# Patient Record
Sex: FEMALE | Race: WHITE | NOT HISPANIC OR LATINO | Employment: UNEMPLOYED | URBAN - METROPOLITAN AREA
[De-identification: names, ages, dates, MRNs, and addresses within clinical notes are randomized per-mention and may not be internally consistent; named-entity substitution may affect disease eponyms.]

---

## 2017-09-27 ENCOUNTER — HOSPITAL ENCOUNTER (EMERGENCY)
Facility: HOSPITAL | Age: 52
Discharge: HOME | End: 2017-09-27
Attending: EMERGENCY MEDICINE
Payer: MEDICAID

## 2017-09-27 VITALS
DIASTOLIC BLOOD PRESSURE: 73 MMHG | SYSTOLIC BLOOD PRESSURE: 129 MMHG | TEMPERATURE: 99 F | RESPIRATION RATE: 12 BRPM | HEIGHT: 62 IN | OXYGEN SATURATION: 98 % | WEIGHT: 118 LBS | BODY MASS INDEX: 21.71 KG/M2 | HEART RATE: 67 BPM

## 2017-09-27 DIAGNOSIS — M54.50 ACUTE BILATERAL LOW BACK PAIN WITHOUT SCIATICA: Primary | ICD-10-CM

## 2017-09-27 PROCEDURE — 96372 THER/PROPH/DIAG INJ SC/IM: CPT

## 2017-09-27 PROCEDURE — 99283 EMERGENCY DEPT VISIT LOW MDM: CPT | Mod: 25

## 2017-09-27 PROCEDURE — 25000003 PHARM REV CODE 250: Performed by: PHYSICIAN ASSISTANT

## 2017-09-27 PROCEDURE — 63600175 PHARM REV CODE 636 W HCPCS: Performed by: PHYSICIAN ASSISTANT

## 2017-09-27 RX ORDER — METHYLPREDNISOLONE 4 MG/1
TABLET ORAL
Qty: 1 PACKAGE | Refills: 0 | Status: SHIPPED | OUTPATIENT
Start: 2017-09-27 | End: 2017-10-18

## 2017-09-27 RX ORDER — CYCLOBENZAPRINE HCL 10 MG
10 TABLET ORAL 3 TIMES DAILY PRN
Status: ON HOLD | COMMUNITY
End: 2018-10-18 | Stop reason: CLARIF

## 2017-09-27 RX ORDER — DICLOFENAC SODIUM 75 MG/1
75 TABLET, DELAYED RELEASE ORAL 2 TIMES DAILY PRN
Qty: 30 TABLET | Refills: 0 | Status: ON HOLD | OUTPATIENT
Start: 2017-09-27 | End: 2018-10-18 | Stop reason: CLARIF

## 2017-09-27 RX ORDER — METHOCARBAMOL 500 MG/1
500 TABLET, FILM COATED ORAL 4 TIMES DAILY
Status: ON HOLD | COMMUNITY
End: 2018-10-18 | Stop reason: CLARIF

## 2017-09-27 RX ORDER — LIDOCAINE 50 MG/G
1 PATCH TOPICAL
Status: DISCONTINUED | OUTPATIENT
Start: 2017-09-27 | End: 2017-09-27 | Stop reason: HOSPADM

## 2017-09-27 RX ORDER — KETOROLAC TROMETHAMINE 30 MG/ML
30 INJECTION, SOLUTION INTRAMUSCULAR; INTRAVENOUS
Status: COMPLETED | OUTPATIENT
Start: 2017-09-27 | End: 2017-09-27

## 2017-09-27 RX ORDER — IBUPROFEN 800 MG/1
800 TABLET ORAL 3 TIMES DAILY
Status: ON HOLD | COMMUNITY
End: 2018-10-18 | Stop reason: CLARIF

## 2017-09-27 RX ORDER — ORPHENADRINE CITRATE 30 MG/ML
60 INJECTION INTRAMUSCULAR; INTRAVENOUS
Status: COMPLETED | OUTPATIENT
Start: 2017-09-27 | End: 2017-09-27

## 2017-09-27 RX ORDER — TIZANIDINE 2 MG/1
4 TABLET ORAL EVERY 6 HOURS PRN
Qty: 20 TABLET | Refills: 0 | Status: SHIPPED | OUTPATIENT
Start: 2017-09-27 | End: 2017-10-07

## 2017-09-27 RX ADMIN — KETOROLAC TROMETHAMINE 30 MG: 30 INJECTION, SOLUTION INTRAMUSCULAR at 10:09

## 2017-09-27 RX ADMIN — LIDOCAINE 1 PATCH: 50 PATCH TOPICAL at 10:09

## 2017-09-27 RX ADMIN — ORPHENADRINE CITRATE 60 MG: 30 INJECTION INTRAMUSCULAR; INTRAVENOUS at 10:09

## 2017-09-27 NOTE — ED PROVIDER NOTES
"Encounter Date: 9/27/2017       History     Chief Complaint   Patient presents with    Nephrolithiasis     On monday seen at urgent care for low back pain s/p exercising and diagnosed with nephrolithiasis.     Khalida Mckeon is a 52 y.o. Female presenting for evaluation of lower back pain, persisting for the last week since injuring the back while working out with her .  She states she stoop up from a "dead lift" when she felt and heard a pop in her lower back.  She has been evaluated at Urgent Care twice.  She has been given Flexeril, but she doesn't take it because it makes her sleepy.  She has taken Ibuprofen, IM Toradol, IM steroid with little improvement.  No loss of bowel or bladder control.  No numbness, tingling or weakness.  The pain worsens with movement.  X-rays were performed at urgent care, which showed incidental renal stones.  She denies any hematuria or dysuria. She has never had problems with kidney stones previously.       The history is provided by the patient.     Review of patient's allergies indicates:  No Known Allergies  History reviewed. No pertinent past medical history.  History reviewed. No pertinent surgical history.  History reviewed. No pertinent family history.  Social History   Substance Use Topics    Smoking status: Former Smoker     Packs/day: 0.50     Years: 20.00     Types: Cigarettes     Quit date: 10/9/2014    Smokeless tobacco: Never Used    Alcohol use Not on file     Review of Systems   Constitutional: Negative for chills and fever.   Respiratory: Negative for cough, chest tightness, shortness of breath and wheezing.    Cardiovascular: Negative for chest pain and palpitations.   Gastrointestinal: Negative for abdominal pain, diarrhea, nausea and vomiting.   Genitourinary: Negative for dysuria, hematuria, pelvic pain, vaginal bleeding and vaginal discharge.   Musculoskeletal: Positive for arthralgias, back pain and myalgias. Negative for joint " swelling, neck pain and neck stiffness.   Skin: Negative for color change, pallor, rash and wound.   Neurological: Negative for weakness and numbness.   Hematological: Does not bruise/bleed easily.   Psychiatric/Behavioral: The patient is not nervous/anxious.        Physical Exam     Initial Vitals [09/27/17 0921]   BP Pulse Resp Temp SpO2   129/73 67 12 98.8 °F (37.1 °C) 98 %      MAP       91.67         Physical Exam    Nursing note and vitals reviewed.  Constitutional: She appears well-developed and well-nourished. She is not diaphoretic. No distress.   HENT:   Head: Normocephalic and atraumatic.   Neck: Normal range of motion. Neck supple.   Cardiovascular: Normal rate, regular rhythm, normal heart sounds and intact distal pulses.   No murmur heard.  Pulmonary/Chest: Breath sounds normal. No respiratory distress. She has no wheezes. She has no rhonchi. She has no rales.   Abdominal: Soft. She exhibits no distension and no mass. There is no tenderness.   No suprapubic tenderness or CVA tenderness.   Musculoskeletal: Normal range of motion. She exhibits tenderness. She exhibits no edema.        Lumbar back: She exhibits tenderness, pain and spasm. She exhibits normal range of motion, no bony tenderness, no swelling, no edema, no deformity, no laceration and normal pulse.        Back:    Tenderness to palpation noted to bilateral lumbar paraspinal muscles with spasm.  Minimal midline lumbar spinous process tenderness noted.  No decreased range of motion, decreased strength or loss of sensation to bilateral lower extremities.  Palpable 2+ pedal pulses.   Neurological: She is alert and oriented to person, place, and time. She has normal strength. No sensory deficit.   Skin: Skin is warm and dry. No rash and no abscess noted. No erythema.   Psychiatric: She has a normal mood and affect.         ED Course   Procedures  Labs Reviewed - No data to display          Medical Decision Making:   Differential Diagnosis:    Lumbar strain  Lumbar radiculopathy  Cauda equina  Epidural abscess  Pyelonephritis  Renal stone       APC / Resident Notes:   X-rays from urgent care are reviewed of the lumbar spine show no acute abnormalities, fractures.  Her symptoms are less likely related to incidental finding of renal stones on previous lumbar spine x-rays.  Her symptoms are likely muscular in nature.  We do not feel emergent MRI is indicated here in the emergency department.  She is given a dose of IM Toradol and Norflex here in the ER.  Lidoderm patches applied.  She is discharged home with prescription for Zanaflex, Voltaren and Medrol Dosepak.  She is encouraged to follow-up with physical therapy and/or chiropractor for reevaluation in the next couple of days.  She is also referred to orthopedic spine surgery, but will not likely be able to see them for several weeks.  She is discharged home in given specific return precautions.              ED Course      Clinical Impression:   The encounter diagnosis was Acute bilateral low back pain without sciatica.                           Suzie Carvajal PA-C  09/27/17 [id number]

## 2017-09-27 NOTE — ED NOTES
"Pt states "if my pain does not all go away by tonight I am coming back here I want it all to be better right now" given Dc follow up care instructions per PA including chiropractic and massage with PT Given written and verbal DC instructions questions answered per MD aware to follow up with PCP encouraged to return if needed. Given RX with teaching.   "

## 2017-11-15 ENCOUNTER — CLINICAL SUPPORT (OUTPATIENT)
Dept: SMOKING CESSATION | Facility: CLINIC | Age: 52
End: 2017-11-15
Payer: COMMERCIAL

## 2017-11-15 DIAGNOSIS — F17.200 NICOTINE DEPENDENCE: Primary | ICD-10-CM

## 2017-11-15 PROCEDURE — 99406 BEHAV CHNG SMOKING 3-10 MIN: CPT | Mod: S$GLB,,, | Performed by: INTERNAL MEDICINE

## 2017-11-21 ENCOUNTER — HOSPITAL ENCOUNTER (OUTPATIENT)
Dept: RADIOLOGY | Facility: HOSPITAL | Age: 52
Discharge: HOME | End: 2017-11-21
Attending: INTERNAL MEDICINE
Payer: MEDICAID

## 2017-11-21 DIAGNOSIS — E21.3 HYPERPARATHYROIDISM: ICD-10-CM

## 2017-11-21 PROCEDURE — 78071 PARATHYRD PLANAR W/WO SUBTRJ: CPT | Mod: 26,,, | Performed by: RADIOLOGY

## 2017-11-21 PROCEDURE — A9500 TC99M SESTAMIBI: HCPCS

## 2017-11-21 PROCEDURE — 78071 PARATHYRD PLANAR W/WO SUBTRJ: CPT | Mod: TC

## 2018-08-01 DIAGNOSIS — E83.52 HYPERCALCEMIA: ICD-10-CM

## 2018-08-01 DIAGNOSIS — E21.3 HPTH (HYPERPARATHYROIDISM): Primary | ICD-10-CM

## 2018-08-08 ENCOUNTER — HOSPITAL ENCOUNTER (OUTPATIENT)
Dept: RADIOLOGY | Facility: HOSPITAL | Age: 53
Discharge: HOME | End: 2018-08-08
Attending: OTOLARYNGOLOGY
Payer: MEDICAID

## 2018-08-08 DIAGNOSIS — E83.52 HYPERCALCEMIA: ICD-10-CM

## 2018-08-08 DIAGNOSIS — E21.3 HPTH (HYPERPARATHYROIDISM): ICD-10-CM

## 2018-08-08 PROCEDURE — 70491 CT SOFT TISSUE NECK W/DYE: CPT | Mod: TC

## 2018-08-08 PROCEDURE — 25500020 PHARM REV CODE 255

## 2018-08-08 PROCEDURE — 70491 CT SOFT TISSUE NECK W/DYE: CPT | Mod: 26,,, | Performed by: RADIOLOGY

## 2018-08-08 RX ORDER — SODIUM CHLORIDE 9 MG/ML
INJECTION, SOLUTION INTRAVENOUS
Status: DISCONTINUED
Start: 2018-08-08 | End: 2018-08-09 | Stop reason: HOSPADM

## 2018-08-08 RX ADMIN — IOHEXOL 75 ML: 350 INJECTION, SOLUTION INTRAVENOUS at 02:08

## 2018-10-18 ENCOUNTER — HOSPITAL ENCOUNTER (OUTPATIENT)
Facility: HOSPITAL | Age: 53
Discharge: HOME | End: 2018-10-19
Attending: EMERGENCY MEDICINE | Admitting: INTERNAL MEDICINE
Payer: MEDICAID

## 2018-10-18 ENCOUNTER — ANESTHESIA EVENT (OUTPATIENT)
Dept: SURGERY | Facility: HOSPITAL | Age: 53
End: 2018-10-18
Payer: MEDICAID

## 2018-10-18 ENCOUNTER — ANESTHESIA (OUTPATIENT)
Dept: SURGERY | Facility: HOSPITAL | Age: 53
End: 2018-10-18
Payer: MEDICAID

## 2018-10-18 DIAGNOSIS — N13.2 URETERAL STONE WITH HYDRONEPHROSIS: ICD-10-CM

## 2018-10-18 DIAGNOSIS — N23 RENAL COLIC: ICD-10-CM

## 2018-10-18 DIAGNOSIS — Z01.818 PRE-OP EVALUATION: ICD-10-CM

## 2018-10-18 DIAGNOSIS — E21.3 HYPERPARATHYROIDISM: Primary | ICD-10-CM

## 2018-10-18 DIAGNOSIS — F17.210 CIGARETTE NICOTINE DEPENDENCE WITHOUT COMPLICATION: ICD-10-CM

## 2018-10-18 LAB
ALBUMIN SERPL BCP-MCNC: 4.4 G/DL
ALP SERPL-CCNC: 96 U/L
ALT SERPL W/O P-5'-P-CCNC: 42 U/L
ANION GAP SERPL CALC-SCNC: 8 MMOL/L
AST SERPL-CCNC: 37 U/L
BACTERIA #/AREA URNS HPF: ABNORMAL /HPF
BASOPHILS # BLD AUTO: 0 K/UL
BASOPHILS NFR BLD: 0.3 %
BILIRUB SERPL-MCNC: 0.3 MG/DL
BILIRUB UR QL STRIP: NEGATIVE
BUN SERPL-MCNC: 16 MG/DL
CALCIUM SERPL-MCNC: 12 MG/DL
CHLORIDE SERPL-SCNC: 105 MMOL/L
CLARITY UR: CLEAR
CO2 SERPL-SCNC: 25 MMOL/L
COLOR UR: YELLOW
CREAT SERPL-MCNC: 0.8 MG/DL
DIFFERENTIAL METHOD: ABNORMAL
EOSINOPHIL # BLD AUTO: 0.1 K/UL
EOSINOPHIL NFR BLD: 1.3 %
ERYTHROCYTE [DISTWIDTH] IN BLOOD BY AUTOMATED COUNT: 13.4 %
EST. GFR  (AFRICAN AMERICAN): >60 ML/MIN/1.73 M^2
EST. GFR  (NON AFRICAN AMERICAN): >60 ML/MIN/1.73 M^2
GLUCOSE SERPL-MCNC: 123 MG/DL
GLUCOSE UR QL STRIP: NEGATIVE
HCT VFR BLD AUTO: 40.6 %
HGB BLD-MCNC: 13.3 G/DL
HGB UR QL STRIP: ABNORMAL
KETONES UR QL STRIP: NEGATIVE
LEUKOCYTE ESTERASE UR QL STRIP: NEGATIVE
LYMPHOCYTES # BLD AUTO: 1.8 K/UL
LYMPHOCYTES NFR BLD: 24.8 %
MCH RBC QN AUTO: 27.8 PG
MCHC RBC AUTO-ENTMCNC: 32.8 G/DL
MCV RBC AUTO: 85 FL
MICROSCOPIC COMMENT: ABNORMAL
MONOCYTES # BLD AUTO: 0.6 K/UL
MONOCYTES NFR BLD: 7.7 %
NEUTROPHILS # BLD AUTO: 4.7 K/UL
NEUTROPHILS NFR BLD: 65.9 %
NITRITE UR QL STRIP: NEGATIVE
PH UR STRIP: 6 [PH] (ref 5–8)
PLATELET # BLD AUTO: 188 K/UL
PMV BLD AUTO: 8.9 FL
POTASSIUM SERPL-SCNC: 4.4 MMOL/L
PROT SERPL-MCNC: 7.5 G/DL
PROT UR QL STRIP: ABNORMAL
RBC # BLD AUTO: 4.78 M/UL
RBC #/AREA URNS HPF: 48 /HPF (ref 0–4)
SODIUM SERPL-SCNC: 138 MMOL/L
SP GR UR STRIP: 1.02 (ref 1–1.03)
URN SPEC COLLECT METH UR: ABNORMAL
UROBILINOGEN UR STRIP-ACNC: NEGATIVE EU/DL
WBC # BLD AUTO: 7.2 K/UL
WBC #/AREA URNS HPF: 2 /HPF (ref 0–5)

## 2018-10-18 PROCEDURE — 99226 PR SUBSEQUENT OBSERVATION CARE,LEVEL III: CPT | Mod: 25,,, | Performed by: UROLOGY

## 2018-10-18 PROCEDURE — 87086 URINE CULTURE/COLONY COUNT: CPT

## 2018-10-18 PROCEDURE — 25000003 PHARM REV CODE 250: Performed by: NURSE ANESTHETIST, CERTIFIED REGISTERED

## 2018-10-18 PROCEDURE — C2617 STENT, NON-COR, TEM W/O DEL: HCPCS | Performed by: UROLOGY

## 2018-10-18 PROCEDURE — 63600175 PHARM REV CODE 636 W HCPCS: Performed by: NURSE ANESTHETIST, CERTIFIED REGISTERED

## 2018-10-18 PROCEDURE — 25000003 PHARM REV CODE 250: Performed by: UROLOGY

## 2018-10-18 PROCEDURE — G0378 HOSPITAL OBSERVATION PER HR: HCPCS

## 2018-10-18 PROCEDURE — 00918 ANES TRURL PX URTRL CAL RMVL: CPT | Performed by: UROLOGY

## 2018-10-18 PROCEDURE — 36415 COLL VENOUS BLD VENIPUNCTURE: CPT

## 2018-10-18 PROCEDURE — D9220A PRA ANESTHESIA: Mod: ANES,,, | Performed by: ANESTHESIOLOGY

## 2018-10-18 PROCEDURE — 99900104 DSU ONLY-NO CHARGE-EA ADD'L HR (STAT): Performed by: UROLOGY

## 2018-10-18 PROCEDURE — 99900103 DSU ONLY-NO CHARGE-INITIAL HR (STAT): Performed by: UROLOGY

## 2018-10-18 PROCEDURE — 25000003 PHARM REV CODE 250: Performed by: EMERGENCY MEDICINE

## 2018-10-18 PROCEDURE — C1758 CATHETER, URETERAL: HCPCS | Performed by: UROLOGY

## 2018-10-18 PROCEDURE — 96374 THER/PROPH/DIAG INJ IV PUSH: CPT

## 2018-10-18 PROCEDURE — 25000003 PHARM REV CODE 250: Performed by: NURSE PRACTITIONER

## 2018-10-18 PROCEDURE — 36000706: Performed by: UROLOGY

## 2018-10-18 PROCEDURE — 52356 CYSTO/URETERO W/LITHOTRIPSY: CPT | Mod: 22,RT,, | Performed by: UROLOGY

## 2018-10-18 PROCEDURE — 27201423 OPTIME MED/SURG SUP & DEVICES STERILE SUPPLY: Performed by: UROLOGY

## 2018-10-18 PROCEDURE — D9220A PRA ANESTHESIA: Mod: CRNA,,, | Performed by: NURSE ANESTHETIST, CERTIFIED REGISTERED

## 2018-10-18 PROCEDURE — 76000 FLUOROSCOPY <1 HR PHYS/QHP: CPT | Mod: 26,59,, | Performed by: UROLOGY

## 2018-10-18 PROCEDURE — 99219 PR INITIAL OBSERVATION CARE,LEVL II: CPT | Mod: ,,, | Performed by: INTERNAL MEDICINE

## 2018-10-18 PROCEDURE — 93010 ELECTROCARDIOGRAM REPORT: CPT | Mod: ,,, | Performed by: INTERNAL MEDICINE

## 2018-10-18 PROCEDURE — 25500020 PHARM REV CODE 255: Performed by: UROLOGY

## 2018-10-18 PROCEDURE — C1769 GUIDE WIRE: HCPCS | Performed by: UROLOGY

## 2018-10-18 PROCEDURE — 81000 URINALYSIS NONAUTO W/SCOPE: CPT

## 2018-10-18 PROCEDURE — 63600175 PHARM REV CODE 636 W HCPCS: Performed by: UROLOGY

## 2018-10-18 PROCEDURE — 37000009 HC ANESTHESIA EA ADD 15 MINS: Performed by: UROLOGY

## 2018-10-18 PROCEDURE — 80053 COMPREHEN METABOLIC PANEL: CPT

## 2018-10-18 PROCEDURE — 85025 COMPLETE CBC W/AUTO DIFF WBC: CPT

## 2018-10-18 PROCEDURE — 25000003 PHARM REV CODE 250: Performed by: INTERNAL MEDICINE

## 2018-10-18 PROCEDURE — 94761 N-INVAS EAR/PLS OXIMETRY MLT: CPT | Mod: 59

## 2018-10-18 PROCEDURE — 36000707: Performed by: UROLOGY

## 2018-10-18 PROCEDURE — S5010 5% DEXTROSE AND 0.45% SALINE: HCPCS | Performed by: UROLOGY

## 2018-10-18 PROCEDURE — 71000033 HC RECOVERY, INTIAL HOUR: Performed by: UROLOGY

## 2018-10-18 PROCEDURE — 99285 EMERGENCY DEPT VISIT HI MDM: CPT | Mod: 25

## 2018-10-18 PROCEDURE — 37000008 HC ANESTHESIA 1ST 15 MINUTES: Performed by: UROLOGY

## 2018-10-18 PROCEDURE — 74420 UROGRAPHY RTRGR +-KUB: CPT | Mod: 26,,, | Performed by: UROLOGY

## 2018-10-18 PROCEDURE — 93005 ELECTROCARDIOGRAM TRACING: CPT | Mod: 59

## 2018-10-18 PROCEDURE — 71000039 HC RECOVERY, EACH ADD'L HOUR: Performed by: UROLOGY

## 2018-10-18 DEVICE — STENT SET URETERAL 6X26CM: Type: IMPLANTABLE DEVICE | Site: URETER | Status: FUNCTIONAL

## 2018-10-18 RX ORDER — SODIUM CHLORIDE 0.9 % (FLUSH) 0.9 %
3 SYRINGE (ML) INJECTION
Status: DISCONTINUED | OUTPATIENT
Start: 2018-10-18 | End: 2018-10-18 | Stop reason: HOSPADM

## 2018-10-18 RX ORDER — GENTAMICIN SULFATE 80 MG/100ML
80 INJECTION, SOLUTION INTRAVENOUS
Status: DISCONTINUED | OUTPATIENT
Start: 2018-10-18 | End: 2018-10-19 | Stop reason: HOSPADM

## 2018-10-18 RX ORDER — SODIUM CHLORIDE, SODIUM LACTATE, POTASSIUM CHLORIDE, CALCIUM CHLORIDE 600; 310; 30; 20 MG/100ML; MG/100ML; MG/100ML; MG/100ML
INJECTION, SOLUTION INTRAVENOUS CONTINUOUS PRN
Status: DISCONTINUED | OUTPATIENT
Start: 2018-10-18 | End: 2018-10-18

## 2018-10-18 RX ORDER — KETOROLAC TROMETHAMINE 30 MG/ML
15 INJECTION, SOLUTION INTRAMUSCULAR; INTRAVENOUS ONCE
Status: COMPLETED | OUTPATIENT
Start: 2018-10-18 | End: 2018-10-18

## 2018-10-18 RX ORDER — ONDANSETRON HYDROCHLORIDE 2 MG/ML
INJECTION, SOLUTION INTRAMUSCULAR; INTRAVENOUS
Status: DISCONTINUED | OUTPATIENT
Start: 2018-10-18 | End: 2018-10-18

## 2018-10-18 RX ORDER — MEPERIDINE HYDROCHLORIDE 50 MG/ML
12.5 INJECTION INTRAMUSCULAR; INTRAVENOUS; SUBCUTANEOUS ONCE AS NEEDED
Status: DISCONTINUED | OUTPATIENT
Start: 2018-10-18 | End: 2018-10-18 | Stop reason: HOSPADM

## 2018-10-18 RX ORDER — BUTALBITAL, ACETAMINOPHEN AND CAFFEINE 50; 325; 40 MG/1; MG/1; MG/1
1 TABLET ORAL EVERY 4 HOURS PRN
COMMUNITY
End: 2018-10-26

## 2018-10-18 RX ORDER — DEXAMETHASONE SODIUM PHOSPHATE 4 MG/ML
INJECTION, SOLUTION INTRA-ARTICULAR; INTRALESIONAL; INTRAMUSCULAR; INTRAVENOUS; SOFT TISSUE
Status: DISCONTINUED | OUTPATIENT
Start: 2018-10-18 | End: 2018-10-18

## 2018-10-18 RX ORDER — FENTANYL CITRATE 50 UG/ML
INJECTION, SOLUTION INTRAMUSCULAR; INTRAVENOUS
Status: DISCONTINUED | OUTPATIENT
Start: 2018-10-18 | End: 2018-10-18

## 2018-10-18 RX ORDER — DIPHENHYDRAMINE HYDROCHLORIDE 50 MG/ML
25 INJECTION INTRAMUSCULAR; INTRAVENOUS EVERY 6 HOURS PRN
Status: DISCONTINUED | OUTPATIENT
Start: 2018-10-18 | End: 2018-10-18 | Stop reason: HOSPADM

## 2018-10-18 RX ORDER — NICOTINE POLACRILEX 2 MG
GUM BUCCAL DAILY
COMMUNITY

## 2018-10-18 RX ORDER — DEXTROSE MONOHYDRATE AND SODIUM CHLORIDE 5; .45 G/100ML; G/100ML
INJECTION, SOLUTION INTRAVENOUS CONTINUOUS
Status: DISCONTINUED | OUTPATIENT
Start: 2018-10-18 | End: 2018-10-19 | Stop reason: HOSPADM

## 2018-10-18 RX ORDER — ONDANSETRON 2 MG/ML
4 INJECTION INTRAMUSCULAR; INTRAVENOUS EVERY 8 HOURS PRN
Status: DISCONTINUED | OUTPATIENT
Start: 2018-10-18 | End: 2018-10-19 | Stop reason: HOSPADM

## 2018-10-18 RX ORDER — PANTOPRAZOLE SODIUM 40 MG/1
40 TABLET, DELAYED RELEASE ORAL DAILY
Status: DISCONTINUED | OUTPATIENT
Start: 2018-10-18 | End: 2018-10-19 | Stop reason: HOSPADM

## 2018-10-18 RX ORDER — MIDAZOLAM HYDROCHLORIDE 1 MG/ML
INJECTION INTRAMUSCULAR; INTRAVENOUS
Status: DISCONTINUED | OUTPATIENT
Start: 2018-10-18 | End: 2018-10-18

## 2018-10-18 RX ORDER — LEVOTHYROXINE SODIUM 75 UG/1
75 TABLET ORAL
COMMUNITY

## 2018-10-18 RX ORDER — AMOXICILLIN 500 MG
1 CAPSULE ORAL DAILY
COMMUNITY
End: 2018-10-23 | Stop reason: CLARIF

## 2018-10-18 RX ORDER — EPHEDRINE SULFATE 50 MG/ML
INJECTION, SOLUTION INTRAVENOUS
Status: DISCONTINUED | OUTPATIENT
Start: 2018-10-18 | End: 2018-10-18

## 2018-10-18 RX ORDER — ACETAMINOPHEN 325 MG/1
650 TABLET ORAL EVERY 6 HOURS PRN
Status: DISCONTINUED | OUTPATIENT
Start: 2018-10-18 | End: 2018-10-19 | Stop reason: HOSPADM

## 2018-10-18 RX ORDER — GLYCOPYRROLATE 0.2 MG/ML
INJECTION INTRAMUSCULAR; INTRAVENOUS
Status: DISCONTINUED | OUTPATIENT
Start: 2018-10-18 | End: 2018-10-18

## 2018-10-18 RX ORDER — AMOXICILLIN 250 MG
1 CAPSULE ORAL DAILY PRN
Status: DISCONTINUED | OUTPATIENT
Start: 2018-10-18 | End: 2018-10-19 | Stop reason: HOSPADM

## 2018-10-18 RX ORDER — PROPOFOL 10 MG/ML
VIAL (ML) INTRAVENOUS
Status: DISCONTINUED | OUTPATIENT
Start: 2018-10-18 | End: 2018-10-18

## 2018-10-18 RX ORDER — SODIUM CHLORIDE 9 MG/ML
INJECTION, SOLUTION INTRAVENOUS CONTINUOUS
Status: DISCONTINUED | OUTPATIENT
Start: 2018-10-18 | End: 2018-10-18

## 2018-10-18 RX ORDER — HYDROMORPHONE HYDROCHLORIDE 2 MG/ML
0.2 INJECTION, SOLUTION INTRAMUSCULAR; INTRAVENOUS; SUBCUTANEOUS EVERY 5 MIN PRN
Status: DISCONTINUED | OUTPATIENT
Start: 2018-10-18 | End: 2018-10-18 | Stop reason: HOSPADM

## 2018-10-18 RX ORDER — FENTANYL CITRATE 50 UG/ML
25 INJECTION, SOLUTION INTRAMUSCULAR; INTRAVENOUS EVERY 5 MIN PRN
Status: DISCONTINUED | OUTPATIENT
Start: 2018-10-18 | End: 2018-10-18 | Stop reason: HOSPADM

## 2018-10-18 RX ORDER — ONDANSETRON 2 MG/ML
4 INJECTION INTRAMUSCULAR; INTRAVENOUS ONCE
Status: DISCONTINUED | OUTPATIENT
Start: 2018-10-18 | End: 2018-10-18 | Stop reason: HOSPADM

## 2018-10-18 RX ORDER — HYDROCODONE BITARTRATE AND ACETAMINOPHEN 5; 325 MG/1; MG/1
1 TABLET ORAL EVERY 6 HOURS PRN
Status: DISCONTINUED | OUTPATIENT
Start: 2018-10-18 | End: 2018-10-19 | Stop reason: HOSPADM

## 2018-10-18 RX ORDER — CEFTRIAXONE 1 G/50ML
1 INJECTION, SOLUTION INTRAVENOUS
Status: COMPLETED | OUTPATIENT
Start: 2018-10-18 | End: 2018-10-18

## 2018-10-18 RX ORDER — LIDOCAINE HCL/PF 100 MG/5ML
SYRINGE (ML) INTRAVENOUS
Status: DISCONTINUED | OUTPATIENT
Start: 2018-10-18 | End: 2018-10-18

## 2018-10-18 RX ORDER — CHOLECALCIFEROL (VITAMIN D3) 50 MCG
2000 TABLET ORAL DAILY
COMMUNITY

## 2018-10-18 RX ORDER — OXYCODONE HYDROCHLORIDE 5 MG/1
5 TABLET ORAL
Status: DISCONTINUED | OUTPATIENT
Start: 2018-10-18 | End: 2018-10-18 | Stop reason: HOSPADM

## 2018-10-18 RX ORDER — SODIUM CHLORIDE, SODIUM LACTATE, POTASSIUM CHLORIDE, CALCIUM CHLORIDE 600; 310; 30; 20 MG/100ML; MG/100ML; MG/100ML; MG/100ML
75 INJECTION, SOLUTION INTRAVENOUS CONTINUOUS
Status: DISCONTINUED | OUTPATIENT
Start: 2018-10-18 | End: 2018-10-18

## 2018-10-18 RX ADMIN — LIDOCAINE HYDROCHLORIDE 100 MG: 20 INJECTION, SOLUTION INTRAVENOUS at 05:10

## 2018-10-18 RX ADMIN — TRAZODONE HYDROCHLORIDE 25 MG: 50 TABLET ORAL at 10:10

## 2018-10-18 RX ADMIN — GLYCOPYRROLATE 0.2 MG: 0.2 INJECTION, SOLUTION INTRAMUSCULAR; INTRAVENOUS at 05:10

## 2018-10-18 RX ADMIN — DEXTROSE AND SODIUM CHLORIDE: 5; .45 INJECTION, SOLUTION INTRAVENOUS at 07:10

## 2018-10-18 RX ADMIN — Medication 1 MG: at 10:10

## 2018-10-18 RX ADMIN — DEXAMETHASONE SODIUM PHOSPHATE 4 MG: 4 INJECTION, SOLUTION INTRAMUSCULAR; INTRAVENOUS at 05:10

## 2018-10-18 RX ADMIN — EPHEDRINE SULFATE 10 MG: 50 INJECTION, SOLUTION INTRAMUSCULAR; INTRAVENOUS; SUBCUTANEOUS at 05:10

## 2018-10-18 RX ADMIN — Medication 1 MG: at 12:10

## 2018-10-18 RX ADMIN — SODIUM CHLORIDE: 0.9 INJECTION, SOLUTION INTRAVENOUS at 12:10

## 2018-10-18 RX ADMIN — PROPOFOL 200 MG: 10 INJECTION, EMULSION INTRAVENOUS at 05:10

## 2018-10-18 RX ADMIN — FENTANYL CITRATE 50 MCG: 50 INJECTION, SOLUTION INTRAMUSCULAR; INTRAVENOUS at 05:10

## 2018-10-18 RX ADMIN — CEFTRIAXONE 1 G: 1 INJECTION, SOLUTION INTRAVENOUS at 05:10

## 2018-10-18 RX ADMIN — GENTAMICIN SULFATE 80 MG: 80 INJECTION, SOLUTION INTRAVENOUS at 07:10

## 2018-10-18 RX ADMIN — MIDAZOLAM HYDROCHLORIDE 2 MG: 1 INJECTION, SOLUTION INTRAMUSCULAR; INTRAVENOUS at 05:10

## 2018-10-18 RX ADMIN — ONDANSETRON 4 MG: 2 INJECTION, SOLUTION INTRAMUSCULAR; INTRAVENOUS at 05:10

## 2018-10-18 RX ADMIN — SODIUM CHLORIDE, SODIUM LACTATE, POTASSIUM CHLORIDE, AND CALCIUM CHLORIDE: .6; .31; .03; .02 INJECTION, SOLUTION INTRAVENOUS at 05:10

## 2018-10-18 RX ADMIN — KETOROLAC TROMETHAMINE 15 MG: 30 INJECTION, SOLUTION INTRAMUSCULAR at 06:10

## 2018-10-18 RX ADMIN — SODIUM CHLORIDE, SODIUM LACTATE, POTASSIUM CHLORIDE, AND CALCIUM CHLORIDE: .6; .31; .03; .02 INJECTION, SOLUTION INTRAVENOUS at 04:10

## 2018-10-18 NOTE — CONSULTS
[facility] [place] [facility] Consult:  Consult from: Dr Mann, [facility] medicine  Consult for: right ureteral calculous obstruction    Khalida Mckeon is a 53 y.o. female found to have 2 obstructing right ureteral calculi on admit for flank pain/n/v.    Reports sudden feeling of n/v woke her up from sleep last night and through morning did not resolve and had abdominal pain/flank pain and continued n/v prompting ER visit.  No history of stones, though was told about 1 year ago that had one present on imaging  Has been undergoing workup for thyroid nodule and HPTH and has pending partial thyroidectomy and parathyroidectomy scheduled for 11/8 at [facility] in .  Has known  Hypercalcemia 2/2 to the above with Ca always ranging 11-13  Denies hematuria dysuria fever chills  2 doses of dilaudid and pain persisted as well as mild nausea    CT stone protocol:  Left kidney; unremarkable appearance without hydronephrosis opaque renal or ureteral stone or ureteral obstruction  Right kidney; asymmetrically enlarged, hypodense, moderate hydronephrosis.  Proximal right ureteral stone at the ureteropelvic junction measuring 1.5 cm cranial caudally, 8 x 10 mm AP and transversely.  Right ureter remains moderately dilated distal to this stone with a 2nd ureteral stone, this 2nd stone being within the distal ureter measuring 9 x 6 by 5 mm several cm above the ureterovesical junction.    On personal review, 1.2x1.5 obstructing UPJ stone with more distal 7x7mm stone in early distal ureter just inside pelvic brim.      No past medical history on file.   Hyperthyoidism  Hyperparathyroidism    No past surgical history on file.    No family history on file.    Social History     Socioeconomic History    Marital status:      Spouse name: Not on file    Number of children: Not on file    Years of education: Not on file    Highest education level: Not on file   Social Needs    Financial resource strain: Not on file    Food insecurity - worry: Not  on file    Food insecurity - inability: Not on file    Transportation needs - medical: Not on file    Transportation needs - non-medical: Not on file   Occupational History    Not on file   Tobacco Use    Smoking status: Former Smoker     Packs/day: 0.50     Years: 20.00     Pack years: 10.00     Types: Cigarettes     Last attempt to quit: 10/9/2014     Years since quittin.0    Smokeless tobacco: Never Used   Substance and Sexual Activity    Alcohol use: Not on file    Drug use: Not on file    Sexual activity: Not on file   Other Topics Concern    Not on file   Social History Narrative    Not on file       Review of patient's allergies indicates:  No Known Allergies    Medications Reviewed: see MAR    ROS:    Constitutional: denies fevers, chills, night sweats, fatigue, malaise  Respiratory: negative for cough, shortness of breath, wheezing, dyspnea.  Cardiovascular: negative for chest pain, varicose veins, ankle swelling, palpitations, syncope.  GI: + abdominal pain, nausea, vomiting, negative for heartburn, indigestion, constipation, diarrhea, blood in stool.   Urology: as noted above in HPI  Endocrinology: negative for cold intolerance, excessive thirst, not feeling tired/sluggish, no heat intolerance.   Hematology/Lymph: negative for easy bleeding, easy bruising, swollen glands.  Musculoskeletal: negative for back pain, joint pain, joint swelling, neck pain.  Allergy-Immunology: negative for seasonal allergies, negative for unusual infections.   Skin: negative for boils, breast lumps, hives, itching, rash.   Neurology: negative for, dizziness, headache, tingling/numbness, tremors.   Psych: satisfied with life; negative for, anxiety, depression, suicidal thoughts.     PHYSICAL EXAM:    Vitals:    10/18/18 1158   BP: (!) 141/75   Pulse: (!) 51   Resp: 18   Temp: 98.1 °F (36.7 °C)     Body mass index is 21.58 kg/m².     General: Alert, cooperative, no distress, appears stated age  Head:  Normocephalic, without obvious abnormality, atraumatic  Neck: no masses, no thyromegaly, no lymphadenopathy  Eyes: PERRL, conjunctiva/corneas clear  Lungs: Respirations unlabored, normal effort, no accessory muscle use  CV: Warm and well perfused extremities  Abdomen: Soft, non-tender, mild R CVA tenderness, no hepatosplenomegaly, no hernia  Extremities: Extremities normal, atraumatic, no cyanosis or edema  Skin: Normal color, texture, and turgor, no rashes or lesions  Psych: Appropriate, well oriented, normal affect, normal mood  Neuro: Non-focal    LABS:    Recent Results (from the past 336 hour(s))   Urinalysis, Reflex to Urine Culture Urine, Clean Catch    Collection Time: 10/18/18  9:44 AM   Result Value Ref Range    Specimen UA Urine, Clean Catch     Color, UA Yellow Yellow, Straw, Nataliya    Appearance, UA Clear Clear    pH, UA 6.0 5.0 - 8.0    Specific Gravity, UA 1.025 1.005 - 1.030    Protein, UA Trace (A) Negative    Glucose, UA Negative Negative    Ketones, UA Negative Negative    Bilirubin (UA) Negative Negative    Occult Blood UA 3+ (A) Negative    Nitrite, UA Negative Negative    Urobilinogen, UA Negative <2.0 EU/dL    Leukocytes, UA Negative Negative   Urinalysis Microscopic    Collection Time: 10/18/18  9:44 AM   Result Value Ref Range    RBC, UA 48 (H) 0 - 4 /hpf    WBC, UA 2 0 - 5 /hpf    Bacteria, UA Occasional None-Occ /hpf    Microscopic Comment SEE COMMENT    Comprehensive metabolic panel    Collection Time: 10/18/18  9:58 AM   Result Value Ref Range    Sodium 138 136 - 145 mmol/L    Potassium 4.4 3.5 - 5.1 mmol/L    Chloride 105 95 - 110 mmol/L    CO2 25 23 - 29 mmol/L    Glucose 123 (H) 70 - 110 mg/dL    BUN, Bld 16 6 - 20 mg/dL    Creatinine 0.8 0.5 - 1.4 mg/dL    Calcium 12.0 (H) 8.7 - 10.5 mg/dL    Total Protein 7.5 6.0 - 8.4 g/dL    Albumin 4.4 3.5 - 5.2 g/dL    Total Bilirubin 0.3 0.1 - 1.0 mg/dL    Alkaline Phosphatase 96 55 - 135 U/L    AST 37 10 - 40 U/L    ALT 42 10 - 44 U/L    Anion  Gap 8 8 - 16 mmol/L    eGFR if African American >60 >60 mL/min/1.73 m^2    eGFR if non African American >60 >60 mL/min/1.73 m^2   CBC auto differential    Collection Time: 10/18/18  9:59 AM   Result Value Ref Range    WBC 7.20 3.90 - 12.70 K/uL    RBC 4.78 4.00 - 5.40 M/uL    Hemoglobin 13.3 12.0 - 16.0 g/dL    Hematocrit 40.6 37.0 - 48.5 %    MCV 85 82 - 98 fL    MCH 27.8 27.0 - 31.0 pg    MCHC 32.8 32.0 - 36.0 g/dL    RDW 13.4 11.5 - 14.5 %    Platelets 188 150 - 350 K/uL    MPV 8.9 (L) 9.2 - 12.9 fL    Gran # (ANC) 4.7 1.8 - 7.7 K/uL    Lymph # 1.8 1.0 - 4.8 K/uL    Mono # 0.6 0.3 - 1.0 K/uL    Eos # 0.1 0.0 - 0.5 K/uL    Baso # 0.00 0.00 - 0.20 K/uL    Gran% 65.9 38.0 - 73.0 %    Lymph% 24.8 18.0 - 48.0 %    Mono% 7.7 4.0 - 15.0 %    Eosinophil% 1.3 0.0 - 8.0 %    Basophil% 0.3 0.0 - 1.9 %    Differential Method Automated          Assessment/Diagnosis:    R ureteral calclui, proximal and distal, with obstruction  Right hydronephrosis  Right flank pain    Plans:  To OR for cystoscopy stent placement on right at minumum, with possible ureteroscopic stone extraction as no signs of infection. Likely if amenable to URS can only manage distal stone and will need to return for management of proximal stone. Could take 2 additional procedures to clear stone burden. Will evaluate for radioopacity of stones and amenability of proximal stone to future ESWL.  Discussed stent symptoms and stent management  1g IV rocephin ordered as preop ppx abx.  All risks and benefits of procedures discussed in detail with family and appropriate informed consent obtained.  Further plan pending intraop findings, though can likely DC home this evening postop if feeling well and return for further stone management. Would ideally complete stone management prior to or soon therafter of other surgery as risk of stent calcification high given her HPTH and hypercalcemia

## 2018-10-18 NOTE — BRIEF OP NOTE
[facility]rology Brief Operative Note    Date: 10/18/2018    Staff Surgeon: Chandler Pritchard MD    Pre-Op Diagnosis:   1. Right proximal and distal obstructing ureteral calculi  2. Right hydroureteronephrosis  3. Right flank pain    Post-Op Diagnosis:   1. Right UPJ obstruction  2. Right renal calculus  3. Right distal ureteral calculus  4. Right hydroureteronephrosis  5. Right flank pain    Procedure(s) Performed:   1. Right rigid ureteroscopy with holmium laser lithotripsy and basket stone extraction (mod 22)  2. Cystoscopy with placement of right double J ureteral stent, [id number]  3. Right retrograde pyelogram  4. Flouroscopy < 1 hour  5. Interpretation of flouroscopic images    Specimen(s): none  - small stone fragment removed from basket extraction removed from backtable before collection    Anesthesia: General LMA anesthesia    Findings:   Tortuous proximal ureter, 180 degree turn to pass wire into collecting system, with evidence of UPJO and proximal jet streaming of contrast at proximal portion of kinked ureter  - right 1.5cm renal calculus was laterally displaced towards midpole and not source of obstruction proximally as concern for UPJO above  - dilated collecting system  - 7mm stone in early distal ureter fragmented and removed, and ureter dilated proximal to this location  - stagnant cloudy urine, bloodtinged, effluxing on relief of obstruction    Estimated Blood Loss: minimal    Drains: 18 Latvian nance    Complications: none    Disposition:   Will keep overnight for observation, admitted back to Dr Mann - case discussed with him  Got 1g IV rocephin preop  Given significant obstruction and appearance of urine draining, 80mg IV gent Q8 while in house  Nance overnight to facilitate clearance of obstructed urine from collecting system along stent  Nance can be removed in AM after MD assessment if afebrile, and DC home    Should have 5d course of broad spectrum abx (ie bactrim ds) and flomax 0.4 mg  daily  Will make plans to reevaluate as outpatient to further evaluate upper tract obstruction (unlikely to be complete given passage of stone into distal ureteral), and may just be sequelae of long term obstruction and hydroureter causing proximal dilation/tortuousity - however pathognomic jet-streaming of contrast and difficult passage of guidewire into collecting system.   Could consider ESWL while stent in place after passive dilation and appropriate drainage, vs repeat cysto/RPG eval, vs. Stent removal and workup for UPJO further with CT urogram and MAG3.

## 2018-10-18 NOTE — TRANSFER OF CARE
"Anesthesia Transfer of Care Note    Patient: Khalida Mckeon    Procedure(s) Performed: Procedure(s) (LRB):  CYSTOSCOPY, WITH RETROGRADE PYELOGRAM AND URETERAL STENT INSERTION (Right)  REMOVAL, CALCULUS, URETER, URETEROSCOPIC (Right)  LITHOTRIPSY, USING LASER (Right)    Patient location: PACU    Anesthesia Type: general    Transport from OR: Transported from OR on 2-3 L/min O2 by NC with adequate spontaneous ventilation    Post pain: adequate analgesia    Post assessment: no apparent anesthetic complications and tolerated procedure well    Post vital signs: stable    Level of consciousness: sedated    Nausea/Vomiting: no nausea/vomiting    Complications: none    Transfer of care protocol was followed      Last vitals:   Visit Vitals  /72 (BP Location: Left arm, Patient Position: Lying)   Pulse 67   Temp 36.9 °C (98.4 °F) (Temporal)   Resp 16   Ht 5' 2" (1.575 m)   Wt 54.4 kg (120 lb 0 oz)   SpO2 97%   Breastfeeding? No   BMI 21.95 kg/m²     "

## 2018-10-18 NOTE — ED PROVIDER NOTES
"Encounter Date: 10/18/2018    SCRIBE #1 NOTE: I, Nyla Adorno, am scribing for, and in the presence of, Dr. Childress.       History     Chief Complaint   Patient presents with    Flank Pain     pain to right flank that started last night       Time seen by provider: 9:32 AM on 10/18/2018    Khalida Mckeon is a 53 y.o. female who presents to the ED with an onset of right side pain with associated nausea. She reports having really high calcium levels and states, "I have never had a kidney stone before, but I know that I have one." The patient had an xray that identified a right kidney stone ~6 months ago and is scheduled to have her thyroid and parathyroid removed in 3 weeks on . She has had intermittent pain over the past week that has progressively worsened since last night. Her pain is worsened with movement. The patient tried to soak in a hot bath but had no improvement in her symptoms. Her PCP is Dr. Brendan Baltazar. She denies fevers, vomiting, discomfort with urinating or any other symptoms at this time. No abdominal SHx noted. No known drug allergies noted.      The history is provided by the patient.     Review of patient's allergies indicates:  No Known Allergies  No past medical history on file.  No past surgical history on file.  No family history on file.  Social History     Tobacco Use    Smoking status: Former Smoker     Packs/day: 0.50     Years: 20.00     Pack years: 10.00     Types: Cigarettes     Last attempt to quit: 10/9/2014     Years since quittin.0    Smokeless tobacco: Never Used   Substance Use Topics    Alcohol use: Not on file    Drug use: Not on file     Review of Systems   Constitutional: Negative for fever.   Gastrointestinal: Positive for nausea. Negative for vomiting.   Genitourinary: Positive for flank pain (right). Negative for dysuria.   All other systems reviewed and are negative.    Physical Exam     Initial Vitals [10/18/18 0925]   BP Pulse Resp Temp SpO2   (!) " 176/85 63 16 98 °F (36.7 °C) 96 %      MAP       --         Physical Exam    Nursing note and vitals reviewed.  Constitutional: She appears well-developed and well-nourished. She is not diaphoretic. No distress.   HENT:   Head: Normocephalic and atraumatic.   Eyes: EOM are normal.   Neck: Normal range of motion. Neck supple.   Cardiovascular: Normal rate, regular rhythm and normal heart sounds. Exam reveals no gallop and no friction rub.    No murmur heard.  Pulmonary/Chest: Breath sounds normal. No respiratory distress. She has no wheezes. She has no rhonchi. She has no rales.   Abdominal: Soft. She exhibits no distension. There is tenderness in the right lower quadrant.   Musculoskeletal: She exhibits no tenderness.   No right flank tenderness.    Neurological: She is alert and oriented to person, place, and time.   Skin: Skin is warm and dry. No rash noted.   No overlying rash.    Psychiatric: She has a normal mood and affect. Her behavior is normal. Judgment and thought content normal.       ED Course   Procedures  Labs Reviewed   CBC W/ AUTO DIFFERENTIAL - Abnormal; Notable for the following components:       Result Value    MPV 8.9 (*)     All other components within normal limits   COMPREHENSIVE METABOLIC PANEL - Abnormal; Notable for the following components:    Glucose 123 (*)     Calcium 12.0 (*)     All other components within normal limits   URINALYSIS, REFLEX TO URINE CULTURE - Abnormal; Notable for the following components:    Protein, UA Trace (*)     Occult Blood UA 3+ (*)     All other components within normal limits    Narrative:     Preferred Collection Type->Urine, Clean Catch   URINALYSIS MICROSCOPIC - Abnormal; Notable for the following components:    RBC, UA 48 (*)     All other components within normal limits    Narrative:     Preferred Collection Type->Urine, Clean Catch        Imaging Results          CT Renal Stone Study ABD Pelvis WO (Final result)  Result time 10/18/18 10:13:36    Final  result by Eleni Gutiérrez MD (10/18/18 10:13:36)                 Impression:      Two right ureteral stones 1 proximal at the ureteral pelvic junction and the other distal ureter appearing moderately obstructing with moderate ureteral pelvocaliectasis and enlarged hypodense right kidney.    Additional findings as detailed above      Electronically signed by: Eleni Gutiérrez MD  Date:    10/18/2018  Time:    10:13             Narrative:    EXAMINATION:  CT RENAL STONE STUDY ABD PELVIS WO    CLINICAL HISTORY:  Flank pain, stone disease suspected;    TECHNIQUE:  Low dose axial images, sagittal and coronal reformations were obtained from the lung bases to the pubic symphysis.  Contrast was not administered.    COMPARISON:  None    FINDINGS:  Liver; unremarkable appearance on the noncontrast study    Spleen; calcified granulomas and otherwise unremarkable appearance    Pancreas; not completely defined separate from adjacent unopacified bowel but as visualized unremarkable appearance    Adrenal glands unremarkable in appearance.    Abdominal aorta nonaneurysmal    Gallbladder and bile ducts; no opaque stones and no biliary duct dilatation    Reproductive organs; uterus and adnexal region unremarkable in appearance    Bowel and mesentery; mildly prominent amount of stool within bowel.  Appendix not identified with certainty but no abnormal appendix inflammatory changes appendiceal region is seen.  No appreciable bowel wall thickening or inflammatory change.  No free intraperitoneal air or fluid    Kidneys and ureters;    Left kidney; unremarkable appearance without hydronephrosis opaque renal or ureteral stone or ureteral obstruction    Right kidney; asymmetrically enlarged, hypodense, moderate hydronephrosis.  Proximal right ureteral stone at the ureteropelvic junction measuring 1.5 cm cranial caudally, 8 x 10 mm AP and transversely.  Right ureter remains moderately dilated distal to this stone with a 2nd ureteral  stone, this 2nd stone being within the distal ureter measuring 9 x 6 by 5 mm several cm above the ureterovesical junction.    Osseous structures show degenerative changes along with mild concavity of the superior endplate of L4 which could represent Schmorl's node or mild compression and is most likely old.                                 Medical Decision Making:   History:   Old Medical Records: I decided to obtain old medical records.  Clinical Tests:   Lab Tests: Ordered and Reviewed  Radiological Study: Reviewed and Ordered            Scribe Attestation:   Scribe #1: I performed the above scribed service and the documentation accurately describes the services I performed. I attest to the accuracy of the note.    I, Dr. Childress, personally performed the services described in this documentation. All medical record entries made by the scribe were at my direction and in my presence.  I have reviewed the chart and agree that the record reflects my personal performance and is accurate and complete.11:35 AM 10/18/2018            ED Course as of Oct 18 1116   Thu Oct 18, 2018   0929 BP: (!) 176/85 [EF]   0929 Temp: 98 °F (36.7 °C) [EF]   0929 Temp src: Oral [EF]   0929 Pulse: 63 [EF]   0929 Resp: 16 [EF]   0929 SpO2: 96 % [EF]   1020 Occult Blood UA: (!) 3+ [EF]   1020 CT Renal Stone Study ABD Pelvis WO [EF]   1031 RBC, UA: (!) 48 [EF]   1031 WBC, UA: 2 [EF]   1031 Bacteria, UA: Occasional [EF]   1031 Nitrite, UA: Negative [EF]   1031 Leukocytes, UA: Negative [EF]   1047 Creatinine: 0.8 [EF]   1047 Calcium: (!) 12.0 [EF]   1053 Case discussed with Dr. Pritchard of Urology who recommends admission.  [EF]   1115 Case discussed with Dr. Mann of Hospital Medicine  [EF]      ED Course User Index  [EF] Ryley Childress MD     Clinical Impression:   There were no encounter diagnoses.      Disposition:   Disposition: Placed in Observation       53-year-old female with hypercalcemia presents to the emergency room with several  weeks of worsening right flank pain. She was told about 6 months ago that she had kidney stones on a plain x-ray but has not followed up with Urology as they were apparently asymptomatic.  Pain began 1-2 weeks ago and has worsened.  She is now having nausea and vomiting but no dysuria.  CT demonstrates 2 very large right-sided kidney stones.  Case discussed with Urology who will see the patient today.  No evidence of an infected stone.  Hospital Medicine to admit.  Comfortable and well-appearing in the emergency room.                 Ryley Childress MD  10/18/18 3439

## 2018-10-18 NOTE — INTERVAL H&P NOTE
The patient has been examined and the H&P has been reviewed:    Proximal and distal right ureteral calculi with obstruction and refractory pain and nausea.  HPTH pending partial thyroidectomy and parathyroidectomy 11/8 at [facility]  Discussed right ureteral stent placement, as well as possible ureteroscopic stone extraction with patient/family.  Discussed expectations of stent.   All questions answered and appropriate informed consent obtained.          Active Hospital Problems    Diagnosis  POA    *Ureteral stone with hydronephrosis, right [N13.2]  Yes    Renal colic [N23]  Yes    Hyperparathyroidism [E21.3]  Yes      Resolved Hospital Problems   No resolved problems to display.

## 2018-10-18 NOTE — ANESTHESIA POSTPROCEDURE EVALUATION
"Anesthesia Post Evaluation    Patient: Khalida Mckeon    Procedure(s) Performed: Procedure(s) (LRB):  CYSTOSCOPY, WITH RETROGRADE PYELOGRAM AND URETERAL STENT INSERTION (Right)  REMOVAL, CALCULUS, URETER, URETEROSCOPIC (Right)  LITHOTRIPSY, USING LASER (Right)    Final Anesthesia Type: general  Patient location during evaluation: PACU  Patient participation: Yes- Able to Participate  Level of consciousness: awake and alert and oriented  Post-procedure vital signs: reviewed and stable  Pain management: adequate  Airway patency: patent  PONV status at discharge: No PONV  Anesthetic complications: no      Cardiovascular status: blood pressure returned to baseline  Respiratory status: unassisted, spontaneous ventilation and room air  Hydration status: euvolemic  Follow-up not needed.        Visit Vitals  BP (!) 147/77   Pulse 82   Temp 36.7 °C (98.1 °F) (Skin)   Resp (!) 8   Ht 5' 2" (1.575 m)   Wt 54.4 kg (120 lb 0 oz)   SpO2 100%   Breastfeeding? No   BMI 21.95 kg/m²       Pain/Tal Score: Pain Assessment Performed: Yes (10/18/2018  3:55 PM)  Presence of Pain: complains of pain/discomfort (10/18/2018  3:55 PM)  Pain Rating Prior to Med Admin: 10 (10/18/2018 12:38 PM)  Pain Rating Post Med Admin: 4 (10/18/2018  1:08 PM)        "

## 2018-10-18 NOTE — ANESTHESIA PREPROCEDURE EVALUATION
10/18/2018  Khalida Mckeon is a 53 y.o., female.    Anesthesia Evaluation    I have reviewed the Patient Summary Reports.    I have reviewed the Nursing Notes.   I have reviewed the Medications.     Review of Systems  Anesthesia Hx:  No problems with previous Anesthesia Denies Hx of Anesthetic complications    Social:  Non-Smoker    Cardiovascular:   Denies Hypertension.  Denies MI.  Denies CAD.    Denies CABG/stent.   Denies Angina.    Pulmonary:   Denies COPD.  Denies Asthma.  Denies Recent URI.    Renal/:   Denies Chronic Renal Disease.     Hepatic/GI:   Denies GERD. Denies Liver Disease.    Neurological:   Denies TIA. Denies CVA. Denies Seizures.    Endocrine:   Denies Diabetes. Hypothyroidism    Psych:   Denies Psychiatric History.          Physical Exam  General:  Well nourished    Airway/Jaw/Neck:  Airway Findings: Mouth Opening: Normal Tongue: Normal  General Airway Assessment: Adult, Good  Mallampati: II  Improves to II with phonation.  TM Distance: 4-6 cm      Dental:  Dental Findings: In tact   Chest/Lungs:  Chest/Lungs Findings: Clear to auscultation, Normal Respiratory Rate     Heart/Vascular:  Heart Findings: Rate: Normal  Rhythm: Regular Rhythm  Sounds: Normal  Heart murmur: negative       Mental Status:  Mental Status Findings:  Cooperative, Alert and Oriented         Anesthesia Plan  Type of Anesthesia, risks & benefits discussed:  Anesthesia Type:  general  Patient's Preference:   Intra-op Monitoring Plan: standard ASA monitors  Intra-op Monitoring Plan Comments:   Post Op Pain Control Plan:   Post Op Pain Control Plan Comments:   Induction:   IV  Beta Blocker:  Patient is not currently on a Beta-Blocker (No further documentation required).       Informed Consent: Patient understands risks and agrees with Anesthesia plan.  Questions answered. Anesthesia consent signed with patient.  ASA  Score: 1     Day of Surgery Review of History & Physical: I have interviewed and examined the patient. I have reviewed the patient's H&P dated:  There are no significant changes.  H&P update referred to the surgeon.         Ready For Surgery From Anesthesia Perspective.

## 2018-10-18 NOTE — NURSING
"Pt to room 401A. Pt complains of pain 15 on a scale of 1-10. Pt requesting analgesic for this pain pt states, "Whatever they gave me in the ER was effective and I need another dose of that now. Called Ban Mann regarding pt complaints of pain and nothing ordered for pain. Left message for Dr. Mann to return call regarding new admit.   "

## 2018-10-18 NOTE — PLAN OF CARE
Pt. Is AAOx4, anxious, cooperative. Pt. Came to ER [facility] today and CT of abdomen revealed kidney stones to right kidney and ureter.  Pt. Stated she was doubled over with pain.  Pain currently controlled with dilaudid and rest.  Pt. States certain positions cause intense pain due to positional stones.  Friend and daughter here in waiting area.  RN reviewed plan of care and answered questions, and pt. Voiced understanding.  Prepared for surgery.

## 2018-10-18 NOTE — H&P
"PCP: Isiah Baltazar MD    History & Physical    Chief Complaint: Right flank pain for 1 week    History of Present Illness:  Patient is a 53 y.o. female admitted to Hospitalist Service from [facility] Room with complaint of Right flank pain for 1 week. Patient reportedly has past medical history significant for Hyperparathyroidism and prior history of smoking. Patient denied prior history of nephrolithiasis. Patient has been having right sided flank, which is intermittent, waxing and waning. No associated hematuria or pyuria, fever or chills reported. Patient has associated nausea but no emesis. No abdominal trauma reported. Patient is expected to to go to the OR by Dr. Pritchard from urology this afternoon. She reported having really high calcium levels and states, "I have never had a kidney stone before, but I know that I have one." The patient had an xray that identified a right kidney stone ~6 months ago and is scheduled to have her thyroid and parathyroid removed in 3 weeks on . Patient denied chest pain, shortness of breath, headache, vision changes, focal neuro-deficits, cough or fever.    PMH: Hyperparathyroidism and prior history of smoking.   No past surgical history on file.  No family history on file.  Social History     Tobacco Use    Smoking status: Former Smoker     Packs/day: 0.50     Years: 20.00     Pack years: 10.00     Types: Cigarettes     Last attempt to quit: 10/9/2014     Years since quittin.0    Smokeless tobacco: Never Used   Substance Use Topics    Alcohol use: Not on file    Drug use: Not on file      Review of patient's allergies indicates:  No Known Allergies  PTA Medications   Medication Sig    cyclobenzaprine (FLEXERIL) 10 MG tablet Take 10 mg by mouth 3 (three) times daily as needed for Muscle spasms.    diclofenac (VOLTAREN) 75 MG EC tablet Take 1 tablet (75 mg total) by mouth 2 (two) times daily as needed.    ibuprofen (ADVIL,MOTRIN) 800 " MG tablet Take 800 mg by mouth 3 (three) times daily.    methocarbamol (ROBAXIN) 500 MG Tab Take 500 mg by mouth 4 (four) times daily.     Review of Systems:  Constitutional: no fever or chills  Eyes: no visual changes  Ears, nose, mouth, throat, and face: no nasal congestion or sore throat  Respiratory: no cough or shorness of breath  Cardiovascular: no chest pain or palpitations  Gastrointestinal: see HPI  Genitourinary: no hematuria or dysuria. + See HPI  Integument/breast: no rash or pruritis  Hematologic/lymphatic: no easy bruising or lymphadenopathy  Musculoskeletal: no arthralgias or myalgias  Neurological: no seizures or tremors.  Behavioral/Psych: no auditory or visual hallucinations  Endocrine: no heat or cold intolerance     OBJECTIVE:     Vital Signs (Most Recent)  Temp: 98.1 °F (36.7 °C) (10/18/18 1158)  Pulse: (!) 51 (10/18/18 1158)  Resp: 18 (10/18/18 1158)  BP: (!) 141/75 (10/18/18 1158)  SpO2: 98 % (10/18/18 1158)    Physical Exam:  General appearance: well developed, appears stated age  Head: normocephalic, atraumatic  Eyes:  conjunctivae/corneas clear. PERRL.  Nose: Nares normal. Septum midline.  Throat: lips, mucosa, and tongue normal; teeth and gums normal, no throat erythema.  Neck: supple, symmetrical, trachea midline, no JVD and thyroid not enlarged, symmetric, no tenderness/mass/nodules  Lungs:  clear to auscultation bilaterally and normal respiratory effort  Chest wall: no tenderness  Heart: regular rate and rhythm, S1, S2 normal, no murmur, click, rub or gallop  Abdomen: soft, right CVA tenderness, non-distented; bowel sounds normal; no masses,  no organomegaly  Extremities: no cyanosis, clubbing or edema.   Pulses: 2+ and symmetric  Skin: Skin color, texture, turgor normal. No rashes or lesions.  Lymph nodes: Cervical, supraclavicular, and axillary nodes normal.  Neurologic: Normal strength and tone. No focal numbness or weakness. CNII-XII intact.      Laboratory:   CBC:   Recent Labs    Lab 10/18/18  0959   WBC 7.20   RBC 4.78   HGB 13.3   HCT 40.6      MCV 85   MCH 27.8   MCHC 32.8     CMP:   Recent Labs   Lab 10/18/18  0958   *   CALCIUM 12.0*   ALBUMIN 4.4   PROT 7.5      K 4.4   CO2 25      BUN 16   CREATININE 0.8   ALKPHOS 96   ALT 42   AST 37   BILITOT 0.3     Recent Labs   Lab 10/18/18  0944   COLORU Yellow   SPECGRAV 1.025   PHUR 6.0   PROTEINUA Trace*   BACTERIA Occasional   NITRITE Negative   LEUKOCYTESUR Negative   UROBILINOGEN Negative     Diagnostic Results:  CT stone protocol:   Two right ureteral stones 1 proximal at the ureteral pelvic junction and the other distal ureter appearing moderately obstructing with moderate ureteral pelvocaliectasis and enlarged hypodense right kidney.    EKG: Sinus bradycardia, 53, no acute ST or TW changes.    Assessment/Plan:     Active Hospital Problems    Diagnosis  POA    *Ureteral stone with hydronephrosis, right [N13.2]  Yes    Renal colic [N23]  Maintain NPO. Patient may proceed for urologic procedure. Patient denied any angina-like complaints or breathing issues. Discussed with Dr. Pritchard who will perform urology intervention this afternoon.  Continue supportive care.  IVF hydration.  Use prn anti-emetics.  Use IV narcotics as needed.    Yes    Hyperparathyroidism [E21.3]  Patient would need to follow up for as planned parathyroidectomy soon.   Yes        DVT prophylaxis: Use SCD and TEDs. Start Lovenox 40 mg sq q day post procedure once approve by Dr. Pritchard.    Hailey Mann MD  Department of Hospital Medicine   [facility]

## 2018-10-19 VITALS
HEIGHT: 62 IN | BODY MASS INDEX: 22.08 KG/M2 | SYSTOLIC BLOOD PRESSURE: 115 MMHG | TEMPERATURE: 99 F | RESPIRATION RATE: 18 BRPM | OXYGEN SATURATION: 99 % | HEART RATE: 60 BPM | DIASTOLIC BLOOD PRESSURE: 65 MMHG | WEIGHT: 120 LBS

## 2018-10-19 LAB
ALBUMIN SERPL BCP-MCNC: 3.4 G/DL
ALP SERPL-CCNC: 74 U/L
ALT SERPL W/O P-5'-P-CCNC: 27 U/L
ANION GAP SERPL CALC-SCNC: 2 MMOL/L
AST SERPL-CCNC: 20 U/L
BASOPHILS # BLD AUTO: 0 K/UL
BASOPHILS NFR BLD: 0.2 %
BILIRUB SERPL-MCNC: 0.3 MG/DL
BUN SERPL-MCNC: 9 MG/DL
CALCIUM SERPL-MCNC: 11 MG/DL
CHLORIDE SERPL-SCNC: 108 MMOL/L
CO2 SERPL-SCNC: 29 MMOL/L
CREAT SERPL-MCNC: 0.7 MG/DL
DIFFERENTIAL METHOD: ABNORMAL
EOSINOPHIL # BLD AUTO: 0 K/UL
EOSINOPHIL NFR BLD: 0.2 %
ERYTHROCYTE [DISTWIDTH] IN BLOOD BY AUTOMATED COUNT: 13.6 %
EST. GFR  (AFRICAN AMERICAN): >60 ML/MIN/1.73 M^2
EST. GFR  (NON AFRICAN AMERICAN): >60 ML/MIN/1.73 M^2
GLUCOSE SERPL-MCNC: 142 MG/DL
HCT VFR BLD AUTO: 34.8 %
HGB BLD-MCNC: 11.3 G/DL
LYMPHOCYTES # BLD AUTO: 1.3 K/UL
LYMPHOCYTES NFR BLD: 19.4 %
MCH RBC QN AUTO: 28 PG
MCHC RBC AUTO-ENTMCNC: 32.5 G/DL
MCV RBC AUTO: 86 FL
MONOCYTES # BLD AUTO: 0.6 K/UL
MONOCYTES NFR BLD: 8.1 %
NEUTROPHILS # BLD AUTO: 4.9 K/UL
NEUTROPHILS NFR BLD: 72.1 %
PLATELET # BLD AUTO: 147 K/UL
PMV BLD AUTO: 9.2 FL
POTASSIUM SERPL-SCNC: 4.2 MMOL/L
PROT SERPL-MCNC: 5.9 G/DL
RBC # BLD AUTO: 4.03 M/UL
SODIUM SERPL-SCNC: 139 MMOL/L
WBC # BLD AUTO: 6.8 K/UL

## 2018-10-19 PROCEDURE — 63600175 PHARM REV CODE 636 W HCPCS: Performed by: UROLOGY

## 2018-10-19 PROCEDURE — 25000003 PHARM REV CODE 250: Performed by: UROLOGY

## 2018-10-19 PROCEDURE — S5010 5% DEXTROSE AND 0.45% SALINE: HCPCS | Performed by: UROLOGY

## 2018-10-19 PROCEDURE — 99224 PR SUBSEQUENT OBSERVATION CARE,LEVEL I: CPT | Mod: ,,, | Performed by: INTERNAL MEDICINE

## 2018-10-19 PROCEDURE — 85025 COMPLETE CBC W/AUTO DIFF WBC: CPT

## 2018-10-19 PROCEDURE — 80053 COMPREHEN METABOLIC PANEL: CPT

## 2018-10-19 PROCEDURE — 25000003 PHARM REV CODE 250: Performed by: INTERNAL MEDICINE

## 2018-10-19 PROCEDURE — 36415 COLL VENOUS BLD VENIPUNCTURE: CPT

## 2018-10-19 PROCEDURE — G0378 HOSPITAL OBSERVATION PER HR: HCPCS

## 2018-10-19 PROCEDURE — 94761 N-INVAS EAR/PLS OXIMETRY MLT: CPT

## 2018-10-19 RX ORDER — BUTALBITAL, ACETAMINOPHEN AND CAFFEINE 50; 325; 40 MG/1; MG/1; MG/1
1 TABLET ORAL EVERY 6 HOURS PRN
Status: DISCONTINUED | OUTPATIENT
Start: 2018-10-19 | End: 2018-10-19 | Stop reason: HOSPADM

## 2018-10-19 RX ORDER — SULFAMETHOXAZOLE AND TRIMETHOPRIM 800; 160 MG/1; MG/1
1 TABLET ORAL 2 TIMES DAILY
Qty: 10 TABLET | Refills: 0 | OUTPATIENT
Start: 2018-10-19 | End: 2018-10-23

## 2018-10-19 RX ORDER — HYDROCODONE BITARTRATE AND ACETAMINOPHEN 5; 325 MG/1; MG/1
1 TABLET ORAL EVERY 6 HOURS PRN
Qty: 15 TABLET | Refills: 0 | Status: SHIPPED | OUTPATIENT
Start: 2018-10-19 | End: 2018-10-23 | Stop reason: CLARIF

## 2018-10-19 RX ORDER — TAMSULOSIN HYDROCHLORIDE 0.4 MG/1
0.4 CAPSULE ORAL DAILY
Qty: 30 CAPSULE | Refills: 0 | Status: ON HOLD | OUTPATIENT
Start: 2018-10-19 | End: 2018-11-01 | Stop reason: SDUPTHER

## 2018-10-19 RX ADMIN — GENTAMICIN SULFATE 80 MG: 80 INJECTION, SOLUTION INTRAVENOUS at 03:10

## 2018-10-19 RX ADMIN — LEVOTHYROXINE SODIUM 75 MCG: 50 TABLET ORAL at 08:10

## 2018-10-19 RX ADMIN — BUTALBITAL, ACETAMINOPHEN, AND CAFFEINE 1 TABLET: 50; 325; 40 TABLET ORAL at 08:10

## 2018-10-19 RX ADMIN — PANTOPRAZOLE SODIUM 40 MG: 40 TABLET, DELAYED RELEASE ORAL at 08:10

## 2018-10-19 RX ADMIN — GENTAMICIN SULFATE 80 MG: 80 INJECTION, SOLUTION INTRAVENOUS at 11:10

## 2018-10-19 RX ADMIN — DEXTROSE AND SODIUM CHLORIDE: 5; .45 INJECTION, SOLUTION INTRAVENOUS at 03:10

## 2018-10-19 NOTE — NURSING
Wetzel catheter removed per order. Pt tolerated well. All urine strained, no stones noted. Last dose of gentamicin infused prior to DC. PIV removed. Catheter intact. DC instructions given. New scripts given. Pt verbalized understanding. Pt refused WC. Ambulated off unit. VSS.

## 2018-10-19 NOTE — PLAN OF CARE
Pt is discharging home with no needs. Kacy Black LMSW     10/19/18 1118   Discharge Assessment   Assessment Type Discharge Planning Assessment

## 2018-10-19 NOTE — PLAN OF CARE
Patient is stable at this time.  VSS. Anesthesiologist at patient's bedside and is ok for patient to transfer to the floor.  Pain is a 3/10.  No complaints of nausea or vomiting.  Patient tolerating po intake well. Urine culture sent to lab.

## 2018-10-19 NOTE — PLAN OF CARE
Problem: Patient Care Overview  Goal: Plan of Care Review  Outcome: Ongoing (interventions implemented as appropriate)  POC discussed with patient, verbalized understanding. Patient with uneventful, slept well between care after receiving Trazodone for sleep as requested. VS stable. [place] clear urine noted in nance, no clots noted. Denied pain throughout shift. Tolerated diet of cold sandwich and juice. Call light at bedside. Receiving IV fluids.

## 2018-10-19 NOTE — DISCHARGE SUMMARY
"Discharge Summary  Hospital Medicine    Admit Date: 10/18/2018    Date and Time: 10/19/669872:58 AM    Discharge Attending Physician: Hailey Mann MD    Primary Care Physician: Isiah Baltazar MD    Diagnoses:  Active Hospital Problems    Diagnosis  POA    *Ureteral stone with hydronephrosis, right [N13.2] s/p cystoscopy with right ureteral stent placement, distal ureteral stone extraction and laser lithotripsy  Yes    Renal colic [N23]  Yes    Hyperparathyroidism [E21.3]  Yes     Discharged Condition: Good    Hospital Course:   Patient is a 53 y.o. female admitted to Hospitalist Service from [facility] Emergency Room with complaint of Right flank pain for 1 week. Patient reportedly has past medical history significant for Hyperparathyroidism and prior history of smoking. Patient denied prior history of nephrolithiasis. Patient has been having right sided flank, which was intermittent, waxing and waning. No associated hematuria or pyuria, fever or chills reported. Patient has associated nausea but no emesis. No abdominal trauma reported. She reported having really high calcium levels and states, "I have never had a kidney stone before, but I know that I have one." The patient had an xray that identified a right kidney stone ~6 months ago and is scheduled to have her thyroid and parathyroid removed in 3 weeks on November 8th. Patient denied chest pain, shortness of breath, headache, vision changes, focal neuro-deficits, cough or fever. Patient was admitted to Hospitalist medicine service. Patient was evaluated by Dr. Pritchard and underwent cystoscopy with right ureteral stent placement, distal ureteral stone extraction and laser lithotripsy. Patient tolerated procedure. Patient observed overnight. Patient is afebrile and symptoms have improved. Patient is cleared for discharge by Dr. Pritchard for close follow up. Patient to have parathyroidectomy scheduled for next month. Patient was discharged home in " stable condition with following discharge plan of care.     Consults: Dr. Pritchard    Significant Diagnostic Studies:   CT stone protocol:   Two right ureteral stones 1 proximal at the ureteral pelvic junction and the other distal ureter appearing moderately obstructing with moderate ureteral pelvocaliectasis and enlarged hypodense right kidney.     EKG: Sinus bradycardia, 53, no acute ST or TW changes.    Microbiology Results (last 7 days)     Procedure Component Value Units Date/Time    Urine culture [[id number]] Collected:  10/18/18 0026    Order Status:  Sent Specimen:  Urine, Catheterized Updated:  10/18/18 2314        Special Treatments/Procedures: as above  Disposition: Home or Self Care    Medications:  Reconciled Home Medications:   Current Discharge Medication List      START taking these medications    Details   HYDROcodone-acetaminophen (NORCO) 5-325 mg per tablet Take 1 tablet by mouth every 6 (six) hours as needed for Pain.  Qty: 15 tablet, Refills: 0      sulfamethoxazole-trimethoprim 800-160mg (BACTRIM DS) 800-160 mg Tab Take 1 tablet by mouth 2 (two) times daily.  Qty: 10 tablet, Refills: 0      tamsulosin (FLOMAX) 0.4 mg Cap Take 1 capsule (0.4 mg total) by mouth once daily.  Qty: 30 capsule, Refills: 0         CONTINUE these medications which have NOT CHANGED    Details   aspirin-acetaminophen-caffeine 250-250-65 mg (EXCEDRIN MIGRAINE) 250-250-65 mg per tablet Take 1 tablet by mouth every 6 (six) hours as needed for Pain (Headaches).      biotin 1 mg Cap Take by mouth once daily.      butalbital-acetaminophen-caffeine -40 mg (FIORICET, ESGIC) -40 mg per tablet Take 1 tablet by mouth every 4 (four) hours as needed for Headaches.      fish oil-omega-3 fatty acids 300-1,000 mg capsule Take 1 capsule by mouth once daily.      levothyroxine (SYNTHROID) 75 MCG tablet Take 75 mcg by mouth before breakfast.      vitamin D (VITAMIN D3) 1000 units Tab Take 1,000 Units by mouth once daily.            Discharge Procedure Orders   Diet Adult Regular     Other restrictions (specify):   Order Comments: PLEASE OBSERVE FALL PRECAUTIONS     Call MD for:   Order Comments: For worsening symptoms, chest pain, shortness of breath, increased abdominal pain, high grade fever, stroke or stroke like symptoms, immediately go to the nearest Emergency Room or call 911 as soon as possible.     Follow-up Information     Isiah Baltazar MD In 1 week.    Specialty:  Internal Medicine  Contact information:  [address] [place] [address] [address]  [place] [address]  [phone]             Chandler Pritchard MD In 1 week.    Specialty:  Urology  Why:  Dr. Holbrook office will get in-touch with you for follow up management plan of care.  Contact information:  [address] [place] [address]   [address]  [place] [address]  [phone]             Please follow up.    Contact information:  Keep well hydrated.

## 2018-10-19 NOTE — PLAN OF CARE
10/19/18 1534   Final Note   Assessment Type Final Discharge Note   Discharge Disposition Home

## 2018-10-20 LAB — BACTERIA UR CULT: NO GROWTH

## 2018-10-20 NOTE — OP NOTE
[facility] Brief Operative Note     Date: 10/18/2018     Staff Surgeon: Chandler Pritchard MD     Pre-Op Diagnosis:   1. Right proximal and distal obstructing ureteral calculi  2. Right hydroureteronephrosis  3. Right flank pain     Post-Op Diagnosis:   1. Right UPJ obstruction vs proximal ureteral kinking from more distal obstruction  2. Right renal calculus  3. Right distal ureteral calculus  4. Right hydroureteronephrosis  5. Right flank pain     Procedure(s) Performed:   1. Right rigid ureteroscopy with holmium laser lithotripsy and basket stone extraction (mod 22)  2. Cystoscopy with placement of right double J ureteral stent, [id number]  3. Right retrograde pyelogram  4. Flouroscopy < 1 hour  5. Interpretation of flouroscopic images     Specimen(s): none  - small stone fragment removed via basket extraction was removed from backtable before collection for chemical analysis     Anesthesia: General LMA anesthesia     Findings:   Tortuous proximal ureter, 180 degree turn to pass wire into collecting system, with concern for UPJO given proximal jet streaming of contrast at proximal portion of kinked ureter into collecting system  - right 1.5cm renal calculus was laterally displaced towards midpole and not source of obstruction proximally as concern for UPJO/proximal ureteral kinking as above  - dilated collecting system  - 7mm stone in early distal ureter fragmented and removed, and ureter dilated proximal to this location  - stagnant cloudy urine, bloodtinged, effluxing on relief of obstruction  - very mild bladder prolapse    (given unexpected finding of possible UPJO vs proximal kinking 2/2 to distal obstructing requiring significant extended efforts solely at guidewire placement into collecting system to allow ureteroscope passage, which later complicated stent placement, modifier 22 should be applied)     Estimated Blood Loss: minimal     Drains: 18 Syriac nance     Complications: none    Indications for  procedure:  Ms Mckeon is a 52 yo F who presented to the emergency department earlier today with abdominal and flank pain, nausea and vomiting, and was found to have a 1.5 cm obstructing proximal ureteral calculus at the ureteropelvic junction, as well as a 7 mm distal obstructing ureteral calculus with moderate hydroureteronephrosis.  She is currently on active treatment plan for hyperthyroidism/thyroid nodule and hyperparathyroidism with pending parathyroidectomy in a few weeks, and reports a known right renal stone from previous imaging by PCP.  Given persistence of pain and nausea despite IV narcotics and antiemetics, as well as her dual obstructing calculi, discussed at minimum cystoscopy and ureteral stent placement to relieve obstruction, with possible concurrent management of distal calculus in the absence of signs or symptoms of infection.  All risks and benefits of aforementioned procedures discussed in detail with the patient, and appropriate informed consent was obtained.    Procedure in detail:  After appropriate informed consent, the patient was taken to the operating room and placed in the lithotomy position.  She was prepped and draped in standard sterile fashion, preoperative antibiotic was given, and a WHO approved time-out was performed.     A 21 Barbadian rigid cystoscope was passed into the bladder via the urethra.  Urethra was grossly normal. Bilateral ureteral orifices were seen in the orthotopic position on the trigone. She did have mild bladder prolapse necessitating sponge stick per vagina to facilitate identification and cannulation of ureteral orifice.   a Cook motion guidewire was passed through the scope to intubate the right ureteral orifice and was passed proximally, though on fluoroscopy the guidewire was seen to curl back on itself medially in the proximal ureter as if it did not reach the collecting system, and her right renal calculus was seen to be radiopaque and more laterally  relative to the guidewire.  Alongside the guidewire in the early distal ureter just inside the pelvic inlet was also would appear to be radiopaque density consistent with her known distal ureteral calculus.      At this time, 5 Bhutanese open-ended catheter was passed over the guidewire under fluoroscopic guidance up to the level of the proximal ureter to facilitate passage of the guidewire, which still was seen to meet resistance and not enter the collecting system.  The open-ended catheter was withdrawn into the proximal ureter and the guidewire discontinued so that dilute Isovue contrast could be injected in a retrograde fashion to perform retrograde pyelogram.  There was significant dilation tortuosity of the proximal ureter with jet streaming of contrast into a dilated collecting system pathognomonic for ureteropelvic junction obstruction, though with the significant tortuosity of the proximal ureter, may represent kinking.  Under live fluoroscopy the guidewire was attempted to be advanced in concert with manipulation of the open-ended catheter, and seen to follow the curve into the next dilated segment of ureter but not into the collecting system.  The guidewire was exchanged for a Glidewire at this time and with continued efforts at manipulation of guidewire and open-ended catheter around the bands of these proximal tortuous curves, ultimately the open-ended catheter was seen in the collecting system as outlined by residual contrast, and noted to take a 180 degree turn before entering the collecting system.  After extensive efforts to access the collecting system, the motion guidewire was passed carefully under live fluoroscopy through the open-ended catheter until it was seen to be within the collecting system and systematically the open-ended catheter was off-loaded while pushing the guidewire more proximally to ensure remained within the collecting system, and was ultimately seen to curl in the collecting  system and straighten out along the ureter.    After this prolonged extensive effort just to place guidewire secondary to the unexpected finding of proximal obstruction concerning for UPJ obstruction, though may just represent significant proximal kinking from the dilation and tortuosity of the more distal ureter from this location secondary to distal obstructing stone, the cystoscope was off-loaded and a semi rigid ureteroscope was passed alongside the guidewire into the right ureteral orifice until ultimately her 7mm  stone in the upper portion of the distal ureter as consistent with imaging, was localized.    The three hundred sixty-five micron holmium laser fiber was then used to perform laser lithotripsy to largely dust the stone until it was a small enough size amenable to basket extraction..  Tipless Nitinol stone basket was then used to remove this stone via basket extraction.  The scope was passed back in and was able to reach proximal to the area of lithotripsy and passed to the proximal ureter which was noted to be quite dilated.      The guidewire was backloaded through the cystoscope over which a 6 Spanish by 24 cm double-J ureteral stent was passed and attempted to be placed in the right ureter using fluoroscopic guidance proximally.  Despite height, this longer stent was chosen secondary to the proximal kinking of her ureter to allow for increased length, however proximal tip was seen not to reach into the dilated portion of collecting system as per retrograde contrast and therefore before off loading, to ensure guidewire remained in collecting system secondary to previous difficulty placing guidewire, the cystoscope was off-loaded and passed back in alongside this guidewire with stent so that an additional guidewire could be placed which was seen to curl proximally in the collecting system and the alternate guidewire with stent were removed at this time.  The guidewire left indwelling in the  collecting system, with proximal curl confirmed in the collecting system secondary to retained contrast, was then backloaded through the cystoscope over which a 6 Slovenian by ureteral stent was placed using fluoroscopic guidance  to confirm proximal curl in collecting system, as denoted by retrograde contrast, and direct vision at the ureteral orifice cystoscopically.  Once the wire was discontinued and the stent was confirmed to be in place, the bladder was drained with the cystoscope sheath and the scope was removed.      Patient tolerated the procedure well there were no complications. She was awakened and taken to PACU without incident.       Disposition:   Will keep overnight for observation, admitted back to Dr Mann - case discussed with him  Got 1g IV rocephin preop  Given significant obstruction and appearance of urine draining, 80mg IV gent Q8 while in house  Wetzel overnight to facilitate clearance of obstructed urine from collecting system along stent  Wetzel can be removed in AM after MD assessment if afebrile, and DC home     Should have 5d course of broad spectrum abx (ie bactrim ds) and flomax 0.4 mg daily  Will make plans to reevaluate as outpatient to further evaluate upper tract obstruction (unlikely to be complete obstruction given passage of stone into distal ureteral), and may just be sequelae of long term obstruction and hydroureter causing proximal dilation/tortuousity/kinking - however pathognomic jet-streaming of contrast and difficult passage of guidewire into collecting system.   Could consider ESWL while stent in place after passive dilation and appropriate drainage, vs repeat cysto/RPG eval, vs. Stent removal and workup for UPJO further with CT urogram and MAG3.       Acute suppurative otitis media of right ear without spontaneous rupture of tympanic membrane, recurrence not specified

## 2018-10-23 ENCOUNTER — TELEPHONE (OUTPATIENT)
Dept: UROLOGY | Facility: CLINIC | Age: 53
End: 2018-10-23

## 2018-10-23 ENCOUNTER — HOSPITAL ENCOUNTER (EMERGENCY)
Facility: HOSPITAL | Age: 53
Discharge: HOME | End: 2018-10-23
Attending: EMERGENCY MEDICINE
Payer: MEDICAID

## 2018-10-23 VITALS
DIASTOLIC BLOOD PRESSURE: 77 MMHG | WEIGHT: 116.19 LBS | HEART RATE: 78 BPM | SYSTOLIC BLOOD PRESSURE: 165 MMHG | OXYGEN SATURATION: 97 % | TEMPERATURE: 99 F | BODY MASS INDEX: 21.38 KG/M2 | RESPIRATION RATE: 16 BRPM | HEIGHT: 62 IN

## 2018-10-23 DIAGNOSIS — R10.9 FLANK PAIN: ICD-10-CM

## 2018-10-23 LAB
BACTERIA #/AREA URNS HPF: ABNORMAL /HPF
BILIRUB UR QL STRIP: ABNORMAL
CLARITY UR: ABNORMAL
COLOR UR: ABNORMAL
GLUCOSE UR QL STRIP: NEGATIVE
HGB UR QL STRIP: ABNORMAL
HYALINE CASTS #/AREA URNS LPF: 0 /LPF
KETONES UR QL STRIP: ABNORMAL
LEUKOCYTE ESTERASE UR QL STRIP: ABNORMAL
MICROSCOPIC COMMENT: ABNORMAL
NITRITE UR QL STRIP: POSITIVE
PH UR STRIP: 7 [PH] (ref 5–8)
PROT UR QL STRIP: ABNORMAL
RBC #/AREA URNS HPF: >100 /HPF (ref 0–4)
SP GR UR STRIP: 1.02 (ref 1–1.03)
URATE CRY URNS QL MICRO: ABNORMAL
URN SPEC COLLECT METH UR: ABNORMAL
UROBILINOGEN UR STRIP-ACNC: 1 EU/DL
WBC #/AREA URNS HPF: 25 /HPF (ref 0–5)

## 2018-10-23 PROCEDURE — 99284 EMERGENCY DEPT VISIT MOD MDM: CPT | Mod: 25

## 2018-10-23 PROCEDURE — 96372 THER/PROPH/DIAG INJ SC/IM: CPT

## 2018-10-23 PROCEDURE — 87086 URINE CULTURE/COLONY COUNT: CPT

## 2018-10-23 PROCEDURE — 25000003 PHARM REV CODE 250: Performed by: EMERGENCY MEDICINE

## 2018-10-23 PROCEDURE — 81000 URINALYSIS NONAUTO W/SCOPE: CPT

## 2018-10-23 PROCEDURE — 63600175 PHARM REV CODE 636 W HCPCS: Performed by: EMERGENCY MEDICINE

## 2018-10-23 RX ORDER — CIPROFLOXACIN 500 MG/1
500 TABLET ORAL
Status: DISCONTINUED | OUTPATIENT
Start: 2018-10-23 | End: 2018-10-23

## 2018-10-23 RX ORDER — KETOROLAC TROMETHAMINE 30 MG/ML
30 INJECTION, SOLUTION INTRAMUSCULAR; INTRAVENOUS
Status: DISCONTINUED | OUTPATIENT
Start: 2018-10-23 | End: 2018-10-23

## 2018-10-23 RX ORDER — CITALOPRAM 20 MG/1
20 TABLET, FILM COATED ORAL DAILY
COMMUNITY

## 2018-10-23 RX ORDER — CIPROFLOXACIN 500 MG/1
500 TABLET ORAL
Status: COMPLETED | OUTPATIENT
Start: 2018-10-23 | End: 2018-10-23

## 2018-10-23 RX ORDER — CIPROFLOXACIN 500 MG/1
500 TABLET ORAL 2 TIMES DAILY
Qty: 13 TABLET | Refills: 0 | Status: SHIPPED | OUTPATIENT
Start: 2018-10-24 | End: 2018-10-31

## 2018-10-23 RX ORDER — KETOROLAC TROMETHAMINE 30 MG/ML
30 INJECTION, SOLUTION INTRAMUSCULAR; INTRAVENOUS
Status: COMPLETED | OUTPATIENT
Start: 2018-10-23 | End: 2018-10-23

## 2018-10-23 RX ORDER — MORPHINE SULFATE 4 MG/ML
6 INJECTION, SOLUTION INTRAMUSCULAR; INTRAVENOUS
Status: COMPLETED | OUTPATIENT
Start: 2018-10-23 | End: 2018-10-23

## 2018-10-23 RX ORDER — HYDROCODONE BITARTRATE AND ACETAMINOPHEN 5; 325 MG/1; MG/1
1 TABLET ORAL EVERY 6 HOURS PRN
Qty: 15 TABLET | Refills: 0 | Status: ON HOLD | OUTPATIENT
Start: 2018-10-23 | End: 2018-11-01 | Stop reason: SDUPTHER

## 2018-10-23 RX ORDER — CIPROFLOXACIN 500 MG/1
TABLET ORAL
Status: DISCONTINUED
Start: 2018-10-23 | End: 2018-10-24 | Stop reason: HOSPADM

## 2018-10-23 RX ADMIN — MORPHINE SULFATE 6 MG: 4 INJECTION INTRAVENOUS at 09:10

## 2018-10-23 RX ADMIN — KETOROLAC TROMETHAMINE 30 MG: 30 INJECTION, SOLUTION INTRAMUSCULAR at 09:10

## 2018-10-23 RX ADMIN — CIPROFLOXACIN 500 MG: 500 TABLET, FILM COATED ORAL at 10:10

## 2018-10-23 NOTE — TELEPHONE ENCOUNTER
Patient states her urine is bright red, no clots.  She is having a lot of pain due to stone moving and stent.  She says she almost went to the ED last night.    Please advise.

## 2018-10-23 NOTE — TELEPHONE ENCOUNTER
----- Message from Betito Tracey sent at 10/23/2018 10:36 AM CDT -----  Contact: self   Patient need to speak with a nurse, she need to know when does she need to be seen again. Patient still has 1 kidney stone and blood in urine. Please call back at [phone] (home)

## 2018-10-23 NOTE — TELEPHONE ENCOUNTER
Planned to call her this week to discuss follow up/  Encourage increased hydration.  Blood in her urine is to be expected while stent in place  If bright red and clear (like koolaid) ok  Increase water, hold fish oil and any aspirin containing products, and go ahead and book her in with me Friday morning into the 0830 spot if she is available to discuss further plans and evaluate.  If fever/chills/or worsening blood and continued clots, should go to ER.  Make sure she is taking her flomax daily as well.

## 2018-10-24 NOTE — ED NOTES
Upon discharge, patient is AAOx4, no cardiac or respiratory complications. Follow up care and  Medications have been reviewed with patient and has been instructed to return to the ER if needed. Patient verbalized understanding and ambulated to the lobby without difficulty. EDDIE ELDER.

## 2018-10-24 NOTE — ED PROVIDER NOTES
"Encounter Date: 10/23/2018    SCRIBE #1 NOTE: I, Christine Leavitt, am scribing for, and in the presence of, Dr. Elier Olivera.       History     Chief Complaint   Patient presents with    Nephrolithiasis     Pt has known 5 mm kidney stone. Here last week and stent placed in right ureter. Now having pain       Time seen by provider: 8:53 PM on 10/23/2018    Khalida Mckeon is a 53 y.o. female with PMHx of hyperparathyroidism who presents to the ED with complaints of right flank pain and blood in urine. Pain "comes and goes" but has been constant for the past x2 hours. Symptoms returned yesterday s/p resolving temporarily after having a stent placement secondary to a kidney stone. Patient has a known 5 mm right ureteral stone with hydronephrosis. She was seen by Dr. Chandler Pritchard to have a cystoscopy, with retrograde pyelogram and ureteral stent insertion, right ureter calculus extraction, and lithotripsy, using laser x5 days ago. Patient states pain improved for a day and returned, but worsened last night. Patient endorses having normal urination with some uncomfortableness. She endorses having blood with urination today and consulted with Dr. Pritchard who stated to the patient that blood urination is normal with a stent placement. She admits to having 1 hydrocodone left and took 2 last night. Patient is also currently on Bactrim. Patient has an appointment with Dr. Pritchard in x3 days, on Friday (10/26). Patient currently has no other medical concerns or complaints at this moment. Other SHx includes tubal ligation. NKDA noted.       The history is provided by the patient.     Review of patient's allergies indicates:  No Known Allergies  Past Medical History:   Diagnosis Date    Hyperparathyroidism     Migraines     Thyroid disease      Past Surgical History:   Procedure Laterality Date    CYSTOSCOPY WITH URETEROSCOPY, RETROGRADE PYELOGRAPHY, AND INSERTION OF STENT Right 10/18/2018    Procedure: CYSTOSCOPY, WITH " RETROGRADE PYELOGRAM AND URETERAL STENT INSERTION;  Surgeon: Chandler Pritchard MD;  Location: [facility] OR;  Service: Urology;  Laterality: Right;    CYSTOSCOPY, WITH RETROGRADE PYELOGRAM AND URETERAL STENT INSERTION Right 10/18/2018    Performed by Chandler Pritchard MD at [facility] OR    LASER LITHOTRIPSY Right 10/18/2018    Procedure: LITHOTRIPSY, USING LASER;  Surgeon: Chandler Pritchard MD;  Location: [facility] OR;  Service: Urology;  Laterality: Right;    LITHOTRIPSY, USING LASER Right 10/18/2018    Performed by Chandler Pritchard MD at [facility] OR    REMOVAL, CALCULUS, URETER, URETEROSCOPIC Right 10/18/2018    Performed by Chandler Pritchard MD at [facility] OR    TUBAL LIGATION      URETEROSCOPIC REMOVAL OF URETERIC CALCULUS Right 10/18/2018    Procedure: REMOVAL, CALCULUS, URETER, URETEROSCOPIC;  Surgeon: Chandler Pritchard MD;  Location: [facility] OR;  Service: Urology;  Laterality: Right;     History reviewed. No pertinent family history.  Social History     Tobacco Use    Smoking status: Former Smoker     Packs/day: 0.50     Years: 20.00     Pack years: 10.00     Types: Cigarettes     Last attempt to quit: 10/9/2014     Years since quittin.0    Smokeless tobacco: Never Used   Substance Use Topics    Alcohol use: Not on file    Drug use: Not on file     Review of Systems   Constitutional: Negative for chills and fever.   HENT: Negative for congestion, rhinorrhea and sore throat.    Respiratory: Negative for cough, chest tightness, shortness of breath and wheezing.    Cardiovascular: Negative for chest pain and palpitations.   Gastrointestinal: Negative for abdominal pain, diarrhea, nausea and vomiting.   Genitourinary: Positive for flank pain (right) and hematuria. Negative for decreased urine volume, difficulty urinating, dysuria, frequency and urgency.   Musculoskeletal: Negative for gait problem, myalgias and neck pain.   Skin: Negative for rash and wound.   Neurological: Negative for dizziness, weakness,  light-headedness, numbness and headaches.   Hematological: Does not bruise/bleed easily.       Physical Exam     Initial Vitals [10/23/18 2036]   BP Pulse Resp Temp SpO2   (!) 165/77 78 16 98.7 °F (37.1 °C) 97 %      MAP       --         Physical Exam    Nursing note and vitals reviewed.  Constitutional: She appears well-developed and well-nourished. She is not diaphoretic.  Non-toxic appearance. She does not have a sickly appearance. She does not appear ill. No distress.   HENT:   Head: Normocephalic and atraumatic.   Right Ear: External ear normal.   Left Ear: External ear normal.   Nose: Nose normal.   Mouth/Throat: Oropharynx is clear and moist.   Eyes: Conjunctivae and EOM are normal.   Neck: Normal range of motion.   Cardiovascular: Normal rate, regular rhythm, normal heart sounds and intact distal pulses. Exam reveals no gallop and no friction rub.    No murmur heard.  Pulmonary/Chest: Breath sounds normal. No respiratory distress. She has no wheezes. She has no rhonchi. She has no rales.   Abdominal: Soft. She exhibits no distension and no mass. There is no tenderness. There is no rebound and no guarding.   No reproducible abdominal tenderness. No distention or masses appreciated.    Genitourinary:   Genitourinary Comments: No flank tenderness noted.    Musculoskeletal: Normal range of motion. She exhibits no edema or tenderness.        Thoracic back: She exhibits no tenderness.        Lumbar back: She exhibits no tenderness.   Neurological: She is alert and oriented to person, place, and time. She has normal strength.   Skin: Capillary refill takes less than 2 seconds. No rash noted. No erythema.   Psychiatric: Her speech is normal.         ED Course   Procedures  Labs Reviewed   CULTURE, URINE   URINALYSIS          Imaging Results    None          Medical Decision Making:   History:   Old Medical Records: I decided to obtain old medical records.  Clinical Tests:   Lab Tests: Reviewed and  Ordered  Radiological Study: Ordered and Reviewed            Scribe Attestation:   Scribe #1: I performed the above scribed service and the documentation accurately describes the services I performed. I attest to the accuracy of the note.      I, Dr. Elier Olivera, personally performed the services described in this documentation. All medical record entries made by the scribe were at my direction and in my presence.  I have reviewed the chart and agree that the record reflects my personal performance and is accurate and complete. Elier Olivera MD.  11:04 PM 10/23/2018    Khalida Mckeon is a 53 y.o. female presenting with persistent right flank pain and hematuria waxing and waning since recent stent placement for ureteral stones with known persistent ureteral stone with hydronephrosis.  Hydronephrosis moderate on ultrasound today with persistent stone seen on x-ray as well as ultrasound.  No evidence of stent migration on x-ray.  Pain is well controlled with IM analgesia in the emergency department.  Refill of hydrocodone as needed for pain given for home with change of antibiotic prescription to Cipro from Bactrim after discussion of black box warning on Cipro with the patient.  Urine culture also sent.  I did discuss with Dr. Pritchard urology management above.  Very low suspicion for sepsis.  I doubt other emergent intra-abdominal process such as atypical appendicitis, abscess.  I do not think repeat CT imaging is indicated.  Follow up with this week as planned.  Detailed return precautions reviewed.          ED Course as of Oct 23 2304   Tue Oct 23, 2018   2130 KUB:  R ureteral stent appearing in good position. (my read)  [MR]   2239 RP US:  1.4 cm calculus R renal pelvis with moderate hydronephrosis. Stent non-visualized.  (rad read)  [MR]      ED Course User Index  [MR] Elier Olivera MD     Clinical Impression:   The encounter diagnosis was Flank pain.                             Elier CAAL  MD Jarod  10/23/18 2192

## 2018-10-24 NOTE — ED NOTES
Presents to the ER with c/o right sided flank pain that is secondary to a 5 mm kidney stone. Patient recently had a stent placed. Patient reports increased pain today. Rates pain 10/10. Mucous membranes are pink and moist. Skin is warm, dry and intact. Lungs are clear bilaterally, respirations are regular and unlabored. Denies cough, congestion, rhinorrhea or SOB. BS active x4, no tenderness with palpation, abd is soft and not distended. Denies any appetite or activity change. S1S2, capillary refill is < 2 seconds. Denies dysuria, difficulty urinating, frequency, numbness, tingling or weakness. JALE SAUCEDOS

## 2018-10-24 NOTE — ED NOTES
Patient is unable to give a urine specimen at this time, secondary to increased pain with ambulation.   US is at the bedside of patient.

## 2018-10-25 LAB — BACTERIA UR CULT: NO GROWTH

## 2018-10-25 NOTE — PROGRESS NOTES
"[facility]:    Khalida Mckeon is a 53 y.o. female who presents for kidney stone follow up    Ms Mckeon is a 54 yo F who presented to the emergency department 10/18/18 with abdominal and flank pain, nausea and vomiting, and was found to have a 1.5 cm obstructing proximal ureteral calculus at the ureteropelvic junction, as well as a 7 mm distal obstructing ureteral calculus with moderate hydroureteronephrosis.  She is currently on active treatment plan for hyperthyroidism/thyroid nodule and hyperparathyroidism with pending parathyroidectomy and partial thyroidectomy on 11/8/18, and reports a known right renal stone from previous imaging by PCP.  Given persistence of pain and nausea despite IV narcotics and antiemetics, as well as her dual obstructing calculi, discussed at minimum cystoscopy and ureteral stent placement to relieve obstruction, with possible concurrent management of distal calculus in the absence of signs or symptoms of infection.    I took her to the OR on 10/18/18 and removed distal stone and placed stent though complicated due to findings of proximal obstruction:  - Tortuous proximal ureter, 180 degree turn to pass wire into collecting system, with concern for UPJO given proximal jet streaming of contrast at proximal portion of kinked ureter into collecting system  - right 1.5cm renal calculus was laterally displaced towards midpole and not source of obstruction proximally as concern for UPJO/proximal ureteral kinking as above  - dilated collecting system  - 7mm stone in early distal ureter fragmented and removed, and ureter dilated proximal to this location  - stagnant cloudy urine, bloodtinged, effluxing on relief of obstruction  - very mild bladder prolapse    Patient did present to ER on 10/23/18 with c/o severe right flank pain and blood in urine, noting pain "comes and goes" but has been constant for the past x2 hours. Symptoms returned day prior s/p resolving temporarily after having a " stent placement.  KUB demonstrated stone and proximal curl of stent in expected location of renal pelvis, and ultrasound found stone in renal pelvis with mild to moderate pelvic dilation/hydro despite stent which was not visualized on ultrasound.  US: Right kidney: The right kidney measures 11.1 x 5 x 5.5 cm. No cortical thinning. No loss of corticomedullary distinction. Resistive index measures 0.56.  No mass. There is a 1.4 cm stone in the distal renal pelvis.  There is moderate pelvocaliectasis  KUB: Double-J ureteral stent on the right with proximal coil near the expected region of the right renal pelvis and distal coil in the expected region of the bladder.  1.1 cm calcification right mid abdomen. Right nephrolith and ureteral stent  - on personal review, stent curl and stone seen in expected location of renal pelvis, there is expected shift down about one vertebral level from supine RPG to erect KUB films    Presents today doing well   Pain has been intermittent but severe at times in right flank  No difference with position changes and may be worse laying down  Has increased water intake  Urine intermittently bloody  Doing well today  Denies n/v/dysuria  Ucx from ER negative.  Udip 2+ leuks, 250 blood    Thyroid/parathyroid surgery rescheduled to 12/6 pending new equipment to target specific parathyroid.    Review of ct notes stone is about 1280 hu    Past Medical History:   Diagnosis Date    Hyperparathyroidism     Migraines     Thyroid disease        Past Surgical History:   Procedure Laterality Date    CYSTOSCOPY WITH URETEROSCOPY, RETROGRADE PYELOGRAPHY, AND INSERTION OF STENT Right 10/18/2018    Procedure: CYSTOSCOPY, WITH RETROGRADE PYELOGRAM AND URETERAL STENT INSERTION;  Surgeon: Chandler Pritchard MD;  Location: [facility] OR;  Service: Urology;  Laterality: Right;    CYSTOSCOPY, WITH RETROGRADE PYELOGRAM AND URETERAL STENT INSERTION Right 10/18/2018    Performed by Chandler Pritchard MD at [facility] OR  "   LASER LITHOTRIPSY Right 10/18/2018    Procedure: LITHOTRIPSY, USING LASER;  Surgeon: Chandler Pritchard MD;  Location: [facility] OR;  Service: Urology;  Laterality: Right;    LITHOTRIPSY, USING LASER Right 10/18/2018    Performed by Chandler Pritchard MD at [facility] OR    REMOVAL, CALCULUS, URETER, URETEROSCOPIC Right 10/18/2018    Performed by Chandler Pritchard MD at [facility] OR    TUBAL LIGATION      URETEROSCOPIC REMOVAL OF URETERIC CALCULUS Right 10/18/2018    Procedure: REMOVAL, CALCULUS, URETER, URETEROSCOPIC;  Surgeon: Chandler Pritchard MD;  Location: [facility] OR;  Service: Urology;  Laterality: Right;       History reviewed. No pertinent family history.    Social History     Socioeconomic History    Marital status:      Spouse name: Not on file    Number of children: Not on file    Years of education: Not on file    Highest education level: Not on file   Social Needs    Financial resource strain: Not on file    Food insecurity - worry: Not on file    Food insecurity - inability: Not on file    Transportation needs - medical: Not on file    Transportation needs - non-medical: Not on file   Occupational History    Not on file   Tobacco Use    Smoking status: Former Smoker     Packs/day: 0.50     Years: 20.00     Pack years: 10.00     Types: Cigarettes     Last attempt to quit: 10/9/2014     Years since quittin.0    Smokeless tobacco: Never Used   Substance and Sexual Activity    Alcohol use: Not on file    Drug use: Not on file    Sexual activity: Not on file   Other Topics Concern    Not on file   Social History Narrative    Not on file       Review of patient's allergies indicates:  No Known Allergies    Medications Reviewed: see MAR    ROS:  As noted above in HPI otherwise negative x 10 systems reviewed    PHYSICAL EXAM:    Vitals:    10/26/18 0839   BP: 116/69   Pulse: 60     Body mass index is 20.71 kg/m². Weight: 51.4 kg (113 lb 3.3 oz) Height: 5' 2" (157.5 cm)       General: Alert, " cooperative, no distress, appears stated age  Head: Normocephalic, without obvious abnormality, atraumatic  Neck: no masses, no thyromegaly, no lymphadenopathy  Eyes: PERRL, conjunctiva/corneas clear  Lungs: Respirations unlabored, normal effort, no accessory muscle use  CV: Warm and well perfused extremities  Abdomen: Soft, non-tender, no CVA tenderness, no hepatosplenomegaly, no hernia  Extremities: Extremities normal, atraumatic, no cyanosis or edema  Skin: Normal color, texture, and turgor, no rashes or lesions  Psych: Appropriate, well oriented, normal affect, normal mood  Neuro: Non-focal        Assessment/Diagnosis:    1. Right kidney stone  POCT URINE DIPSTICK WITHOUT MICROSCOPE    Diet NPO    Case Request Operating Room: REMOVAL, CALCULUS, URETER    Place in Outpatient    Insert peripheral IV   2. Renal calculus, right     3. Cells and casts in urine  Urine culture       Plans:  We did discuss the possibility of a right ureteropelvic junction obstruction, vs proximal kinking of ureter from more longstanding distal obstruction, and with stent in place is more difficult to evaluate for the former - though there is persistent collecting system dilation despite stent which may indicate obstruction.  She does also have a large right renal pelvis stone.  Discussed options to both treat stone and further evaluate for obstruction.  Would be hesitant to ESWL at this time in case truly narrow UPJO and fragments could not pass, as well would be hesitant to remove stent to work up for obstruction with further studies as may be symptomatic again or renal pelvic stone may obstruct again.  After extensive discussion, elected to proceed with ureteroscopy for direct visualiztion of UPJ to see if stenosed/obstructed. If could pass with scope could perform laser lithotripsy, or if truly narrowed could make attempt to displace stone and if stenosis directly visualized given pathognomonic RPG, could proceed with  pyeloplasty.  Briefly discussed pyeloplasty in future if UPJO found. Also reviewed all risks and benefits of ureteroscopic stone management. All questions answered and appropriate informed consent obtained.  R URS/HLL 11/1/18 in OR. Will recheck UCx today.    40 mins spent in encounter, over half in counseling, including review of imaging and surgical planning

## 2018-10-25 NOTE — H&P (VIEW-ONLY)
"[facility] Urology:    Khalida Mckeon is a 53 y.o. female who presents for kidney stone follow up    Ms Mckeon is a 52 yo F who presented to the emergency department 10/18/18 with abdominal and flank pain, nausea and vomiting, and was found to have a 1.5 cm obstructing proximal ureteral calculus at the ureteropelvic junction, as well as a 7 mm distal obstructing ureteral calculus with moderate hydroureteronephrosis.  She is currently on active treatment plan for hyperthyroidism/thyroid nodule and hyperparathyroidism with pending parathyroidectomy and partial thyroidectomy on 11/8/18, and reports a known right renal stone from previous imaging by PCP.  Given persistence of pain and nausea despite IV narcotics and antiemetics, as well as her dual obstructing calculi, discussed at minimum cystoscopy and ureteral stent placement to relieve obstruction, with possible concurrent management of distal calculus in the absence of signs or symptoms of infection.    I took her to the OR on 10/18/18 and removed distal stone and placed stent though complicated due to findings of proximal obstruction:  - Tortuous proximal ureter, 180 degree turn to pass wire into collecting system, with concern for UPJO given proximal jet streaming of contrast at proximal portion of kinked ureter into collecting system  - right 1.5cm renal calculus was laterally displaced towards midpole and not source of obstruction proximally as concern for UPJO/proximal ureteral kinking as above  - dilated collecting system  - 7mm stone in early distal ureter fragmented and removed, and ureter dilated proximal to this location  - stagnant cloudy urine, bloodtinged, effluxing on relief of obstruction  - very mild bladder prolapse    Patient did present to ER on 10/23/18 with c/o severe right flank pain and blood in urine, noting pain "comes and goes" but has been constant for the past x2 hours. Symptoms returned day prior s/p resolving temporarily after having a " stent placement.  KUB demonstrated stone and proximal curl of stent in expected location of renal pelvis, and ultrasound found stone in renal pelvis with mild to moderate pelvic dilation/hydro despite stent which was not visualized on ultrasound.  US: Right kidney: The right kidney measures 11.1 x 5 x 5.5 cm. No cortical thinning. No loss of corticomedullary distinction. Resistive index measures 0.56.  No mass. There is a 1.4 cm stone in the distal renal pelvis.  There is moderate pelvocaliectasis  KUB: Double-J ureteral stent on the right with proximal coil near the expected region of the right renal pelvis and distal coil in the expected region of the bladder.  1.1 cm calcification right mid abdomen. Right nephrolith and ureteral stent  - on personal review, stent curl and stone seen in expected location of renal pelvis, there is expected shift down about one vertebral level from supine RPG to erect KUB films    Presents today doing well   Pain has been intermittent but severe at times in right flank  No difference with position changes and may be worse laying down  Has increased water intake  Urine intermittently bloody  Doing well today  Denies n/v/dysuria  Ucx from ER negative.  Udip 2+ leuks, 250 blood    Thyroid/parathyroid surgery rescheduled to 12/6 pending new equipment to target specific parathyroid.    Review of ct notes stone is about 1280 hu    Past Medical History:   Diagnosis Date    Hyperparathyroidism     Migraines     Thyroid disease        Past Surgical History:   Procedure Laterality Date    CYSTOSCOPY WITH URETEROSCOPY, RETROGRADE PYELOGRAPHY, AND INSERTION OF STENT Right 10/18/2018    Procedure: CYSTOSCOPY, WITH RETROGRADE PYELOGRAM AND URETERAL STENT INSERTION;  Surgeon: Chandler Pritchard MD;  Location: [facility] OR;  Service: Urology;  Laterality: Right;    CYSTOSCOPY, WITH RETROGRADE PYELOGRAM AND URETERAL STENT INSERTION Right 10/18/2018    Performed by Chandler Pritchard MD at [facility] OR  "   LASER LITHOTRIPSY Right 10/18/2018    Procedure: LITHOTRIPSY, USING LASER;  Surgeon: Chandler Pritchard MD;  Location: [facility] OR;  Service: Urology;  Laterality: Right;    LITHOTRIPSY, USING LASER Right 10/18/2018    Performed by Chandler Pritchard MD at [facility] OR    REMOVAL, CALCULUS, URETER, URETEROSCOPIC Right 10/18/2018    Performed by Chandler Pritchard MD at [facility] OR    TUBAL LIGATION      URETEROSCOPIC REMOVAL OF URETERIC CALCULUS Right 10/18/2018    Procedure: REMOVAL, CALCULUS, URETER, URETEROSCOPIC;  Surgeon: Chandler Pritchard MD;  Location: [facility] OR;  Service: Urology;  Laterality: Right;       History reviewed. No pertinent family history.    Social History     Socioeconomic History    Marital status:      Spouse name: Not on file    Number of children: Not on file    Years of education: Not on file    Highest education level: Not on file   Social Needs    Financial resource strain: Not on file    Food insecurity - worry: Not on file    Food insecurity - inability: Not on file    Transportation needs - medical: Not on file    Transportation needs - non-medical: Not on file   Occupational History    Not on file   Tobacco Use    Smoking status: Former Smoker     Packs/day: 0.50     Years: 20.00     Pack years: 10.00     Types: Cigarettes     Last attempt to quit: 10/9/2014     Years since quittin.0    Smokeless tobacco: Never Used   Substance and Sexual Activity    Alcohol use: Not on file    Drug use: Not on file    Sexual activity: Not on file   Other Topics Concern    Not on file   Social History Narrative    Not on file       Review of patient's allergies indicates:  No Known Allergies    Medications Reviewed: see MAR    ROS:  As noted above in HPI otherwise negative x 10 systems reviewed    PHYSICAL EXAM:    Vitals:    10/26/18 0839   BP: 116/69   Pulse: 60     Body mass index is 20.71 kg/m². Weight: 51.4 kg (113 lb 3.3 oz) Height: 5' 2" (157.5 cm)       General: Alert, " cooperative, no distress, appears stated age  Head: Normocephalic, without obvious abnormality, atraumatic  Neck: no masses, no thyromegaly, no lymphadenopathy  Eyes: PERRL, conjunctiva/corneas clear  Lungs: Respirations unlabored, normal effort, no accessory muscle use  CV: Warm and well perfused extremities  Abdomen: Soft, non-tender, no CVA tenderness, no hepatosplenomegaly, no hernia  Extremities: Extremities normal, atraumatic, no cyanosis or edema  Skin: Normal color, texture, and turgor, no rashes or lesions  Psych: Appropriate, well oriented, normal affect, normal mood  Neuro: Non-focal        Assessment/Diagnosis:    1. Right kidney stone  POCT URINE DIPSTICK WITHOUT MICROSCOPE    Diet NPO    Case Request Operating Room: REMOVAL, CALCULUS, URETER    Place in Outpatient    Insert peripheral IV   2. Renal calculus, right     3. Cells and casts in urine  Urine culture       Plans:  We did discuss the possibility of a right ureteropelvic junction obstruction, vs proximal kinking of ureter from more longstanding distal obstruction, and with stent in place is more difficult to evaluate for the former - though there is persistent collecting system dilation despite stent which may indicate obstruction.  She does also have a large right renal pelvis stone.  Discussed options to both treat stone and further evaluate for obstruction.  Would be hesitant to ESWL at this time in case truly narrow UPJO and fragments could not pass, as well would be hesitant to remove stent to work up for obstruction with further studies as may be symptomatic again or renal pelvic stone may obstruct again.  After extensive discussion, elected to proceed with ureteroscopy for direct visualiztion of UPJ to see if stenosed/obstructed. If could pass with scope could perform laser lithotripsy, or if truly narrowed could make attempt to displace stone and if stenosis directly visualized given pathognomonic RPG, could proceed with  pyeloplasty.  Briefly discussed pyeloplasty in future if UPJO found. Also reviewed all risks and benefits of ureteroscopic stone management. All questions answered and appropriate informed consent obtained.  R URS/HLL 11/1/18 in OR. Will recheck UCx today.    40 mins spent in encounter, over half in counseling, including review of imaging and surgical planning

## 2018-10-25 NOTE — H&P (VIEW-ONLY)
"[facility] Urology:    Khalida Mckeon is a 53 y.o. female who presents for kidney stone follow up    Ms Mckeon is a 54 yo F who presented to the emergency department 10/18/18 with abdominal and flank pain, nausea and vomiting, and was found to have a 1.5 cm obstructing proximal ureteral calculus at the ureteropelvic junction, as well as a 7 mm distal obstructing ureteral calculus with moderate hydroureteronephrosis.  She is currently on active treatment plan for hyperthyroidism/thyroid nodule and hyperparathyroidism with pending parathyroidectomy and partial thyroidectomy on 11/8/18, and reports a known right renal stone from previous imaging by PCP.  Given persistence of pain and nausea despite IV narcotics and antiemetics, as well as her dual obstructing calculi, discussed at minimum cystoscopy and ureteral stent placement to relieve obstruction, with possible concurrent management of distal calculus in the absence of signs or symptoms of infection.    I took her to the OR on 10/18/18 and removed distal stone and placed stent though complicated due to findings of proximal obstruction:  - Tortuous proximal ureter, 180 degree turn to pass wire into collecting system, with concern for UPJO given proximal jet streaming of contrast at proximal portion of kinked ureter into collecting system  - right 1.5cm renal calculus was laterally displaced towards midpole and not source of obstruction proximally as concern for UPJO/proximal ureteral kinking as above  - dilated collecting system  - 7mm stone in early distal ureter fragmented and removed, and ureter dilated proximal to this location  - stagnant cloudy urine, bloodtinged, effluxing on relief of obstruction  - very mild bladder prolapse    Patient did present to ER on 10/23/18 with c/o severe right flank pain and blood in urine, noting pain "comes and goes" but has been constant for the past x2 hours. Symptoms returned day prior s/p resolving temporarily after having a " stent placement.  KUB demonstrated stone and proximal curl of stent in expected location of renal pelvis, and ultrasound found stone in renal pelvis with mild to moderate pelvic dilation/hydro despite stent which was not visualized on ultrasound.  US: Right kidney: The right kidney measures 11.1 x 5 x 5.5 cm. No cortical thinning. No loss of corticomedullary distinction. Resistive index measures 0.56.  No mass. There is a 1.4 cm stone in the distal renal pelvis.  There is moderate pelvocaliectasis  KUB: Double-J ureteral stent on the right with proximal coil near the expected region of the right renal pelvis and distal coil in the expected region of the bladder.  1.1 cm calcification right mid abdomen. Right nephrolith and ureteral stent  - on personal review, stent curl and stone seen in expected location of renal pelvis, there is expected shift down about one vertebral level from supine RPG to erect KUB films    Presents today doing well   Pain has been intermittent but severe at times in right flank  No difference with position changes and may be worse laying down  Has increased water intake  Urine intermittently bloody  Doing well today  Denies n/v/dysuria  Ucx from ER negative.  Udip 2+ leuks, 250 blood    Thyroid/parathyroid surgery rescheduled to 12/6 pending new equipment to target specific parathyroid.    Review of ct notes stone is about 1280 hu    Past Medical History:   Diagnosis Date    Hyperparathyroidism     Migraines     Thyroid disease        Past Surgical History:   Procedure Laterality Date    CYSTOSCOPY WITH URETEROSCOPY, RETROGRADE PYELOGRAPHY, AND INSERTION OF STENT Right 10/18/2018    Procedure: CYSTOSCOPY, WITH RETROGRADE PYELOGRAM AND URETERAL STENT INSERTION;  Surgeon: Chandler Pritchard MD;  Location: [facility] OR;  Service: Urology;  Laterality: Right;    CYSTOSCOPY, WITH RETROGRADE PYELOGRAM AND URETERAL STENT INSERTION Right 10/18/2018    Performed by Chandler Pritchard MD at [facility] OR  "   LASER LITHOTRIPSY Right 10/18/2018    Procedure: LITHOTRIPSY, USING LASER;  Surgeon: Chandler Pritchard MD;  Location: [facility] OR;  Service: Urology;  Laterality: Right;    LITHOTRIPSY, USING LASER Right 10/18/2018    Performed by Chandler Pritchard MD at [facility] OR    REMOVAL, CALCULUS, URETER, URETEROSCOPIC Right 10/18/2018    Performed by Chandler Pritchard MD at [facility] OR    TUBAL LIGATION      URETEROSCOPIC REMOVAL OF URETERIC CALCULUS Right 10/18/2018    Procedure: REMOVAL, CALCULUS, URETER, URETEROSCOPIC;  Surgeon: Chandler Pritchard MD;  Location: [facility] OR;  Service: Urology;  Laterality: Right;       History reviewed. No pertinent family history.    Social History     Socioeconomic History    Marital status:      Spouse name: Not on file    Number of children: Not on file    Years of education: Not on file    Highest education level: Not on file   Social Needs    Financial resource strain: Not on file    Food insecurity - worry: Not on file    Food insecurity - inability: Not on file    Transportation needs - medical: Not on file    Transportation needs - non-medical: Not on file   Occupational History    Not on file   Tobacco Use    Smoking status: Former Smoker     Packs/day: 0.50     Years: 20.00     Pack years: 10.00     Types: Cigarettes     Last attempt to quit: 10/9/2014     Years since quittin.0    Smokeless tobacco: Never Used   Substance and Sexual Activity    Alcohol use: Not on file    Drug use: Not on file    Sexual activity: Not on file   Other Topics Concern    Not on file   Social History Narrative    Not on file       Review of patient's allergies indicates:  No Known Allergies    Medications Reviewed: see MAR    ROS:  As noted above in HPI otherwise negative x 10 systems reviewed    PHYSICAL EXAM:    Vitals:    10/26/18 0839   BP: 116/69   Pulse: 60     Body mass index is 20.71 kg/m². Weight: 51.4 kg (113 lb 3.3 oz) Height: 5' 2" (157.5 cm)       General: Alert, " cooperative, no distress, appears stated age  Head: Normocephalic, without obvious abnormality, atraumatic  Neck: no masses, no thyromegaly, no lymphadenopathy  Eyes: PERRL, conjunctiva/corneas clear  Lungs: Respirations unlabored, normal effort, no accessory muscle use  CV: Warm and well perfused extremities  Abdomen: Soft, non-tender, no CVA tenderness, no hepatosplenomegaly, no hernia  Extremities: Extremities normal, atraumatic, no cyanosis or edema  Skin: Normal color, texture, and turgor, no rashes or lesions  Psych: Appropriate, well oriented, normal affect, normal mood  Neuro: Non-focal        Assessment/Diagnosis:    1. Right kidney stone  POCT URINE DIPSTICK WITHOUT MICROSCOPE    Diet NPO    Case Request Operating Room: REMOVAL, CALCULUS, URETER    Place in Outpatient    Insert peripheral IV   2. Renal calculus, right     3. Cells and casts in urine  Urine culture       Plans:  We did discuss the possibility of a right ureteropelvic junction obstruction, vs proximal kinking of ureter from more longstanding distal obstruction, and with stent in place is more difficult to evaluate for the former - though there is persistent collecting system dilation despite stent which may indicate obstruction.  She does also have a large right renal pelvis stone.  Discussed options to both treat stone and further evaluate for obstruction.  Would be hesitant to ESWL at this time in case truly narrow UPJO and fragments could not pass, as well would be hesitant to remove stent to work up for obstruction with further studies as may be symptomatic again or renal pelvic stone may obstruct again.  After extensive discussion, elected to proceed with ureteroscopy for direct visualiztion of UPJ to see if stenosed/obstructed. If could pass with scope could perform laser lithotripsy, or if truly narrowed could make attempt to displace stone and if stenosis directly visualized given pathognomonic RPG, could proceed with  pyeloplasty.  Briefly discussed pyeloplasty in future if UPJO found. Also reviewed all risks and benefits of ureteroscopic stone management. All questions answered and appropriate informed consent obtained.  R URS/HLL 11/1/18 in OR. Will recheck UCx today.    40 mins spent in encounter, over half in counseling, including review of imaging and surgical planning

## 2018-10-26 ENCOUNTER — OFFICE VISIT (OUTPATIENT)
Dept: UROLOGY | Facility: CLINIC | Age: 53
End: 2018-10-26
Payer: MEDICAID

## 2018-10-26 VITALS
SYSTOLIC BLOOD PRESSURE: 116 MMHG | HEART RATE: 60 BPM | HEIGHT: 62 IN | DIASTOLIC BLOOD PRESSURE: 69 MMHG | BODY MASS INDEX: 20.83 KG/M2 | WEIGHT: 113.19 LBS

## 2018-10-26 DIAGNOSIS — N20.0 RIGHT KIDNEY STONE: Primary | ICD-10-CM

## 2018-10-26 DIAGNOSIS — N20.0 RENAL CALCULUS, RIGHT: ICD-10-CM

## 2018-10-26 DIAGNOSIS — R82.998 CELLS AND CASTS IN URINE: ICD-10-CM

## 2018-10-26 LAB
BILIRUB SERPL-MCNC: ABNORMAL MG/DL
BLOOD URINE, POC: 250
COLOR, POC UA: ABNORMAL
GLUCOSE UR QL STRIP: ABNORMAL
KETONES UR QL STRIP: ABNORMAL
LEUKOCYTE ESTERASE URINE, POC: ABNORMAL
NITRITE, POC UA: ABNORMAL
PH, POC UA: 6
PROTEIN, POC: ABNORMAL
SPECIFIC GRAVITY, POC UA: 1.02
UROBILINOGEN, POC UA: ABNORMAL

## 2018-10-26 PROCEDURE — 99999 PR PBB SHADOW E&M-EST. PATIENT-LVL V: CPT | Mod: PBBFAC,,, | Performed by: UROLOGY

## 2018-10-26 PROCEDURE — 99215 OFFICE O/P EST HI 40 MIN: CPT | Mod: PBBFAC,PN | Performed by: UROLOGY

## 2018-10-26 PROCEDURE — 81002 URINALYSIS NONAUTO W/O SCOPE: CPT | Mod: PBBFAC,PN | Performed by: UROLOGY

## 2018-10-26 PROCEDURE — 99215 OFFICE O/P EST HI 40 MIN: CPT | Mod: S$PBB,,, | Performed by: UROLOGY

## 2018-10-26 PROCEDURE — 87086 URINE CULTURE/COLONY COUNT: CPT

## 2018-10-26 RX ORDER — VIT C/E/ZN/COPPR/LUTEIN/ZEAXAN 250MG-90MG
CAPSULE ORAL
Status: ON HOLD | COMMUNITY
End: 2018-11-13

## 2018-10-26 NOTE — LETTER
October 27, 2018        Isiah Baltazar MD  [address] [place] [address] [address]  [place] [address]             [place] [facility]  [address] [place] [address] [address]  [place] [address]  Phone: [phone]  Fax: [phone]   Patient: Khalida Mckeon   MR Number: [id number]   YOB: 1965   Date of Visit: 10/26/2018       Dear Dr. Baltazar:    I have seen your patient Khalida Mckeon for management of kidney stones and possible UPJ obstruction. Attached you will find relevant portions of my assessment and plan of care.    If you have questions, please do not hesitate to call me. I look forward to following Khalida Mckeon along with you.    Sincerely,        Chandler Pritchard MD            CC  No Recipients    Enclosure

## 2018-10-28 LAB — BACTERIA UR CULT: NO GROWTH

## 2018-10-31 ENCOUNTER — ANESTHESIA EVENT (OUTPATIENT)
Dept: SURGERY | Facility: HOSPITAL | Age: 53
End: 2018-10-31
Payer: MEDICAID

## 2018-10-31 RX ORDER — AMOXICILLIN 500 MG
CAPSULE ORAL DAILY
COMMUNITY
End: 2019-12-21

## 2018-10-31 NOTE — PRE-PROCEDURE INSTRUCTIONS
Phone pre-op done.  Patient verbalized understanding of instructions and education.  Tamika Palmer

## 2018-11-01 ENCOUNTER — ANESTHESIA (OUTPATIENT)
Dept: SURGERY | Facility: HOSPITAL | Age: 53
End: 2018-11-01
Payer: MEDICAID

## 2018-11-01 ENCOUNTER — HOSPITAL ENCOUNTER (OUTPATIENT)
Facility: HOSPITAL | Age: 53
Discharge: HOME | End: 2018-11-01
Attending: UROLOGY | Admitting: UROLOGY
Payer: MEDICAID

## 2018-11-01 DIAGNOSIS — N20.0 RIGHT KIDNEY STONE: ICD-10-CM

## 2018-11-01 DIAGNOSIS — N20.0 RENAL CALCULUS, RIGHT: ICD-10-CM

## 2018-11-01 PROCEDURE — C1894 INTRO/SHEATH, NON-LASER: HCPCS | Performed by: UROLOGY

## 2018-11-01 PROCEDURE — 25000003 PHARM REV CODE 250: Performed by: ANESTHESIOLOGY

## 2018-11-01 PROCEDURE — 00873 ANES LITHOTRP ESW WO WTR BTH: CPT | Performed by: UROLOGY

## 2018-11-01 PROCEDURE — 99900104 DSU ONLY-NO CHARGE-EA ADD'L HR (STAT): Performed by: UROLOGY

## 2018-11-01 PROCEDURE — 27201423 OPTIME MED/SURG SUP & DEVICES STERILE SUPPLY: Performed by: UROLOGY

## 2018-11-01 PROCEDURE — C1758 CATHETER, URETERAL: HCPCS | Performed by: UROLOGY

## 2018-11-01 PROCEDURE — 25500020 PHARM REV CODE 255: Performed by: UROLOGY

## 2018-11-01 PROCEDURE — 27200651 HC AIRWAY, LMA: Performed by: NURSE ANESTHETIST, CERTIFIED REGISTERED

## 2018-11-01 PROCEDURE — 71000033 HC RECOVERY, INTIAL HOUR: Performed by: UROLOGY

## 2018-11-01 PROCEDURE — 37000008 HC ANESTHESIA 1ST 15 MINUTES: Performed by: UROLOGY

## 2018-11-01 PROCEDURE — 63600175 PHARM REV CODE 636 W HCPCS: Performed by: ANESTHESIOLOGY

## 2018-11-01 PROCEDURE — 82365 CALCULUS SPECTROSCOPY: CPT

## 2018-11-01 PROCEDURE — D9220A PRA ANESTHESIA: Mod: ANES,,, | Performed by: ANESTHESIOLOGY

## 2018-11-01 PROCEDURE — 63600175 PHARM REV CODE 636 W HCPCS: Performed by: NURSE ANESTHETIST, CERTIFIED REGISTERED

## 2018-11-01 PROCEDURE — 25000003 PHARM REV CODE 250: Performed by: NURSE ANESTHETIST, CERTIFIED REGISTERED

## 2018-11-01 PROCEDURE — 71000015 HC POSTOP RECOV 1ST HR: Performed by: UROLOGY

## 2018-11-01 PROCEDURE — C1769 GUIDE WIRE: HCPCS | Performed by: UROLOGY

## 2018-11-01 PROCEDURE — 74420 UROGRAPHY RTRGR +-KUB: CPT | Mod: 26,,, | Performed by: UROLOGY

## 2018-11-01 PROCEDURE — D9220A PRA ANESTHESIA: Mod: CRNA,,, | Performed by: NURSE ANESTHETIST, CERTIFIED REGISTERED

## 2018-11-01 PROCEDURE — 37000009 HC ANESTHESIA EA ADD 15 MINS: Performed by: UROLOGY

## 2018-11-01 PROCEDURE — 63600175 PHARM REV CODE 636 W HCPCS: Performed by: UROLOGY

## 2018-11-01 PROCEDURE — 71000039 HC RECOVERY, EACH ADD'L HOUR: Performed by: UROLOGY

## 2018-11-01 PROCEDURE — 99900103 DSU ONLY-NO CHARGE-INITIAL HR (STAT): Performed by: UROLOGY

## 2018-11-01 PROCEDURE — C2617 STENT, NON-COR, TEM W/O DEL: HCPCS | Performed by: UROLOGY

## 2018-11-01 PROCEDURE — 76000 FLUOROSCOPY <1 HR PHYS/QHP: CPT | Mod: 26,59,, | Performed by: UROLOGY

## 2018-11-01 PROCEDURE — 36000706: Performed by: UROLOGY

## 2018-11-01 PROCEDURE — 36000707: Performed by: UROLOGY

## 2018-11-01 PROCEDURE — 52356 CYSTO/URETERO W/LITHOTRIPSY: CPT | Mod: RT,,, | Performed by: UROLOGY

## 2018-11-01 DEVICE — STENT SET URETERAL 6X24CM: Type: IMPLANTABLE DEVICE | Site: URETER | Status: FUNCTIONAL

## 2018-11-01 RX ORDER — PROPOFOL 10 MG/ML
VIAL (ML) INTRAVENOUS
Status: DISCONTINUED | OUTPATIENT
Start: 2018-11-01 | End: 2018-11-01

## 2018-11-01 RX ORDER — GLYCOPYRROLATE 0.2 MG/ML
INJECTION INTRAMUSCULAR; INTRAVENOUS
Status: DISCONTINUED | OUTPATIENT
Start: 2018-11-01 | End: 2018-11-01

## 2018-11-01 RX ORDER — LIDOCAINE HCL/PF 100 MG/5ML
SYRINGE (ML) INTRAVENOUS
Status: DISCONTINUED | OUTPATIENT
Start: 2018-11-01 | End: 2018-11-01

## 2018-11-01 RX ORDER — OXYCODONE HYDROCHLORIDE 5 MG/1
5 TABLET ORAL
Status: DISCONTINUED | OUTPATIENT
Start: 2018-11-01 | End: 2018-11-01 | Stop reason: HOSPADM

## 2018-11-01 RX ORDER — CEFAZOLIN SODIUM 1 G/3ML
INJECTION, POWDER, FOR SOLUTION INTRAMUSCULAR; INTRAVENOUS
Status: DISCONTINUED | OUTPATIENT
Start: 2018-11-01 | End: 2018-11-01

## 2018-11-01 RX ORDER — SODIUM CHLORIDE, SODIUM LACTATE, POTASSIUM CHLORIDE, CALCIUM CHLORIDE 600; 310; 30; 20 MG/100ML; MG/100ML; MG/100ML; MG/100ML
INJECTION, SOLUTION INTRAVENOUS CONTINUOUS
Status: DISCONTINUED | OUTPATIENT
Start: 2018-11-01 | End: 2018-11-01 | Stop reason: HOSPADM

## 2018-11-01 RX ORDER — LIDOCAINE HYDROCHLORIDE 10 MG/ML
5 INJECTION, SOLUTION EPIDURAL; INFILTRATION; INTRACAUDAL; PERINEURAL ONCE
Status: COMPLETED | OUTPATIENT
Start: 2018-11-01 | End: 2018-11-01

## 2018-11-01 RX ORDER — TAMSULOSIN HYDROCHLORIDE 0.4 MG/1
0.4 CAPSULE ORAL DAILY
Qty: 30 CAPSULE | Refills: 0 | Status: ON HOLD | OUTPATIENT
Start: 2018-11-01 | End: 2018-11-13 | Stop reason: HOSPADM

## 2018-11-01 RX ORDER — FENTANYL CITRATE 50 UG/ML
25 INJECTION, SOLUTION INTRAMUSCULAR; INTRAVENOUS EVERY 5 MIN PRN
Status: COMPLETED | OUTPATIENT
Start: 2018-11-01 | End: 2018-11-01

## 2018-11-01 RX ORDER — ONDANSETRON HYDROCHLORIDE 2 MG/ML
INJECTION, SOLUTION INTRAMUSCULAR; INTRAVENOUS
Status: DISCONTINUED | OUTPATIENT
Start: 2018-11-01 | End: 2018-11-01

## 2018-11-01 RX ORDER — HYDROMORPHONE HYDROCHLORIDE 2 MG/ML
0.2 INJECTION, SOLUTION INTRAMUSCULAR; INTRAVENOUS; SUBCUTANEOUS ONCE
Status: COMPLETED | OUTPATIENT
Start: 2018-11-01 | End: 2018-11-01

## 2018-11-01 RX ORDER — PHENYLEPHRINE HYDROCHLORIDE 10 MG/ML
INJECTION INTRAVENOUS
Status: DISCONTINUED | OUTPATIENT
Start: 2018-11-01 | End: 2018-11-01

## 2018-11-01 RX ORDER — MIDAZOLAM HYDROCHLORIDE 1 MG/ML
INJECTION, SOLUTION INTRAMUSCULAR; INTRAVENOUS
Status: DISCONTINUED | OUTPATIENT
Start: 2018-11-01 | End: 2018-11-01

## 2018-11-01 RX ORDER — HYDROCODONE BITARTRATE AND ACETAMINOPHEN 5; 325 MG/1; MG/1
1 TABLET ORAL EVERY 6 HOURS PRN
Qty: 10 TABLET | Refills: 0 | Status: ON HOLD | OUTPATIENT
Start: 2018-11-01 | End: 2018-11-13

## 2018-11-01 RX ORDER — HYDROMORPHONE HYDROCHLORIDE 2 MG/ML
0.2 INJECTION, SOLUTION INTRAMUSCULAR; INTRAVENOUS; SUBCUTANEOUS EVERY 5 MIN PRN
Status: DISCONTINUED | OUTPATIENT
Start: 2018-11-01 | End: 2018-11-01 | Stop reason: HOSPADM

## 2018-11-01 RX ORDER — PHENAZOPYRIDINE HYDROCHLORIDE 200 MG/1
200 TABLET, FILM COATED ORAL 3 TIMES DAILY PRN
Qty: 15 TABLET | Refills: 0 | Status: SHIPPED | OUTPATIENT
Start: 2018-11-01 | End: 2018-11-11

## 2018-11-01 RX ORDER — GENTAMICIN SULFATE 80 MG/100ML
80 INJECTION, SOLUTION INTRAVENOUS
Status: COMPLETED | OUTPATIENT
Start: 2018-11-01 | End: 2018-11-01

## 2018-11-01 RX ORDER — CEFAZOLIN SODIUM 1 G/50ML
1 SOLUTION INTRAVENOUS
Status: DISCONTINUED | OUTPATIENT
Start: 2018-11-01 | End: 2018-11-01 | Stop reason: HOSPADM

## 2018-11-01 RX ORDER — SULFAMETHOXAZOLE AND TRIMETHOPRIM 800; 160 MG/1; MG/1
1 TABLET ORAL 2 TIMES DAILY
Qty: 10 TABLET | Refills: 0 | Status: SHIPPED | OUTPATIENT
Start: 2018-11-01 | End: 2018-11-06

## 2018-11-01 RX ORDER — FENTANYL CITRATE 50 UG/ML
INJECTION, SOLUTION INTRAMUSCULAR; INTRAVENOUS
Status: DISCONTINUED | OUTPATIENT
Start: 2018-11-01 | End: 2018-11-01

## 2018-11-01 RX ADMIN — LIDOCAINE HYDROCHLORIDE 100 MG: 20 INJECTION, SOLUTION INTRAVENOUS at 12:11

## 2018-11-01 RX ADMIN — FENTANYL CITRATE 25 MCG: 50 INJECTION INTRAMUSCULAR; INTRAVENOUS at 01:11

## 2018-11-01 RX ADMIN — FENTANYL CITRATE 50 MCG: 50 INJECTION, SOLUTION INTRAMUSCULAR; INTRAVENOUS at 11:11

## 2018-11-01 RX ADMIN — SODIUM CHLORIDE, SODIUM LACTATE, POTASSIUM CHLORIDE, AND CALCIUM CHLORIDE: .6; .31; .03; .02 INJECTION, SOLUTION INTRAVENOUS at 11:11

## 2018-11-01 RX ADMIN — SODIUM CHLORIDE, SODIUM LACTATE, POTASSIUM CHLORIDE, AND CALCIUM CHLORIDE: .6; .31; .03; .02 INJECTION, SOLUTION INTRAVENOUS at 12:11

## 2018-11-01 RX ADMIN — PROPOFOL 160 MG: 10 INJECTION, EMULSION INTRAVENOUS at 12:11

## 2018-11-01 RX ADMIN — GLYCOPYRROLATE 0.2 MG: 0.2 INJECTION, SOLUTION INTRAMUSCULAR; INTRAVENOUS at 12:11

## 2018-11-01 RX ADMIN — GENTAMICIN SULFATE 80 MG: 80 INJECTION, SOLUTION INTRAVENOUS at 11:11

## 2018-11-01 RX ADMIN — ONDANSETRON 4 MG: 2 INJECTION, SOLUTION INTRAMUSCULAR; INTRAVENOUS at 12:11

## 2018-11-01 RX ADMIN — PHENYLEPHRINE HYDROCHLORIDE 100 MCG: 10 INJECTION INTRAVENOUS at 12:11

## 2018-11-01 RX ADMIN — HYDROMORPHONE HYDROCHLORIDE 0.2 MG: 2 INJECTION, SOLUTION INTRAMUSCULAR; INTRAVENOUS; SUBCUTANEOUS at 02:11

## 2018-11-01 RX ADMIN — PHENYLEPHRINE HYDROCHLORIDE 200 MCG: 10 INJECTION INTRAVENOUS at 12:11

## 2018-11-01 RX ADMIN — CEFAZOLIN 1 G: 1 INJECTION, POWDER, FOR SOLUTION INTRAVENOUS at 12:11

## 2018-11-01 RX ADMIN — LIDOCAINE HYDROCHLORIDE 5 MG: 10 INJECTION, SOLUTION EPIDURAL; INFILTRATION; INTRACAUDAL; PERINEURAL at 10:11

## 2018-11-01 RX ADMIN — MIDAZOLAM 2 MG: 1 INJECTION INTRAMUSCULAR; INTRAVENOUS at 11:11

## 2018-11-01 RX ADMIN — OXYCODONE HYDROCHLORIDE 5 MG: 5 TABLET ORAL at 01:11

## 2018-11-01 NOTE — ANESTHESIA PREPROCEDURE EVALUATION
11/01/2018  Khalida Mckeon is a 53 y.o., female.    Pre-op Assessment    I have reviewed the Patient Summary Reports.     I have reviewed the Nursing Notes.   I have reviewed the Medications.     Review of Systems  Anesthesia Hx:  No problems with previous Anesthesia Denies Hx of Anesthetic complications    Social:  Non-Smoker    Cardiovascular:   Denies Hypertension.  Denies MI.  Denies CAD.    Denies CABG/stent.   Denies Angina.    Pulmonary:   Denies COPD.  Denies Asthma.  Denies Recent URI.    Renal/:   Denies Chronic Renal Disease.     Hepatic/GI:   Denies GERD. Denies Liver Disease.    Neurological:   Denies TIA. Denies CVA. Denies Seizures.    Endocrine:   Denies Diabetes. Hypothyroidism    Psych:   Denies Psychiatric History.          Physical Exam  General:  Well nourished    Airway/Jaw/Neck:  Airway Findings: Mouth Opening: Normal Tongue: Normal  General Airway Assessment: Adult, Good  Mallampati: II  Improves to II with phonation.  TM Distance: 4-6 cm      Dental:  Dental Findings: In tact   Chest/Lungs:  Chest/Lungs Findings: Clear to auscultation, Normal Respiratory Rate     Heart/Vascular:  Heart Findings: Rate: Normal  Rhythm: Regular Rhythm  Sounds: Normal  Heart murmur: negative       Mental Status:  Mental Status Findings:  Cooperative, Alert and Oriented         Anesthesia Plan  Type of Anesthesia, risks & benefits discussed:  Anesthesia Type:  general  Patient's Preference:   Intra-op Monitoring Plan: standard ASA monitors  Intra-op Monitoring Plan Comments:   Post Op Pain Control Plan:   Post Op Pain Control Plan Comments:   Induction:   IV  Beta Blocker:  Patient is not currently on a Beta-Blocker (No further documentation required).       Informed Consent: Patient understands risks and agrees with Anesthesia plan.  Questions answered. Anesthesia consent signed with patient.  ASA  Score: 1     Day of Surgery Review of History & Physical: I have interviewed and examined the patient. I have reviewed the patient's H&P dated:  There are no significant changes.  H&P update referred to the surgeon.         Ready For Surgery From Anesthesia Perspective.

## 2018-11-01 NOTE — DISCHARGE INSTRUCTIONS
Discharge home today status post uncomplicated procedure as above  Diet - resume home diet  Follow up: tues 11/13 in PM at [facility] for cysto/stent removal  Instructions: drink plenty of water, continue flomax until stent removed, take antibiotics as directed        Ureteral Stents  A ureteral stent is a soft plastic tube with holes in it. Its temporarily inserted into a ureter to help drain urine into the bladder. One end goes in the kidney. The other end goes in the bladder. A coil on each end holds the stent in place. The stent cant be seen from outside the body. It shouldnt interfere with your normal routine. Your stent will be put in by a doctor trained in treating the urinary tract (a urologist) or another specialist. The procedure is done in a hospital or surgery center. Youll likely go home the same day.  When is a ureteral stent used?  A ureteral stent may be used:  · To bypass a blockage in a kidney or ureter.  · During kidney stone removal.  · To let a ureter heal after surgery.    Before the Procedure  Your healthcare provider will give you instructions to prepare for the procedure. X-rays or other imaging tests of your kidneys and ureters may be done beforehand.  During the procedure  · You receive medicine to prevent pain and help you relax or sleep during the procedure. Once this takes effect, the procedure starts.  · The doctor inserts a cystoscope (lighted instrument) through the urethra and into the bladder. This shows the opening to the ureter.  · A thin wire is carefully threaded through the cystoscope, up the ureter, and into the kidney. The stent is inserted over the wire.  · A fluoroscope (special X-ray machine) is used to help position the stent. When the stent is in place, the wire and cystoscope are removed.  While you have a stent  · Some discomfort is normal. Certain movements may trigger pain or a feeling that you need to urinate. You may also feel mild soreness or pressure before  or during urination. These symptoms will go away a few days after the stent is removed.  · Medicine to control pain or bladder spasms or to prevent infection may be prescribed. Take this as directed.  · Drink plenty of fluids to help flush out your urinary tract.  · Your urine may be slightly pink or red. This is due to bleeding caused by minor irritation from the stent. This may happen on and off while you have the stent.  · As with any synthetic device placed in the body, there is a risk of infection. The stent may have to be removed if this happens.   How long will you need a stent?  The stent is often taken out after the blockage in the ureter is treated or the ureter has healed. This may take 1 week to 2 weeks, or longer. If a stent is needed for a long time, it may need to be changed every few months.  When to call your healthcare provider  Contact your healthcare provider right away if:  · Your urine contains blood clots or you see a large amount of blood-tinged urine  · You have symptoms similar to those you had before the stent was placed  · You constantly leak urine  · You have a fever over 100.4°F (38°C), chills, nausea, or vomiting  · Your pain is not relieved with medicine  · The end of the stent comes out of the urethra   Date Last Reviewed: 1/1/2017  © 6002-8900 [employer]. [address] [place] [address]. All rights reserved. This information is not intended as a substitute for professional medical care. Always follow your healthcare professional's instructions.      Cystoscopy    Cystoscopy is a procedure that lets your doctor look directly inside your urethra and bladder. It can be used to:  · Help diagnose a problem with your urethra, bladder, or kidneys.  · Take a sample (biopsy) of bladder or urethral tissue.  · Treat certain problems (such as removing kidney stones).  · Place a stent to bypass an obstruction.  · Take special X-rays of the kidneys.  Based on the  findings, your doctor may recommend other tests or treatments.  What is a cystoscope?  A cystoscope is a telescope-like instrument that contains lenses and fiberoptics (small glass wires that make bright light). The cystoscope may be straight and rigid, or flexible to bend around curves in the urethra. The doctor may look directly into the cystoscope, or project the image onto a monitor.  Getting ready  · Ask your doctor if you should stop taking any medicines before the procedure.  · Ask whether you should avoid eating or drinking anything after midnight before the procedure.  · Follow any other instructions your doctor gives you.  Tell your doctor before the exam if you:  · Take any medicines, such as aspirin or blood thinners  · Have allergies to any medicines  · Are pregnant   The procedure  Cystoscopy is done in the doctors office, surgery center, or hospital. The doctor and a nurse are present during the procedure. It takes only a few minutes, longer if a biopsy, X-ray, or treatment needs to be done.  During the procedure:  · You lie on an exam table on your back, knees bent and legs apart. You are covered with a drape.  · Your urethra and the area around it are washed. Anesthetic jelly may be applied to numb the urethra. Other pain medicine is usually not needed. In some cases, you may be offered a mild sedative to help you relax. If a more extensive procedure is to be done, such as a biopsy or kidney stone removal, general anesthesia may be needed.  · The cystoscope is inserted. A sterile fluid is put into the bladder to expand it. You may feel pressure from this fluid.  · When the procedure is done, the cystoscope is removed.  After the procedure  If you had a sedative, general anesthesia, or spinal anesthesia, you must have someone drive you home. Once youre home:  · Drink plenty of fluids.  · You may have burning or light bleeding when you urinate--this is normal.  · Medicines may be prescribed to ease  any discomfort or prevent infection. Take these as directed.  · Call your doctor if you have heavy bleeding or blood clots, burning that lasts more than a day, a fever over 100°F  (38° C), or trouble urinating.  Date Last Reviewed: 1/1/2017  © 2704-1935 [employer]. [address] [place] [address], [place] [address]. All rights reserved. This information is not intended as a substitute for professional medical care. Always follow your healthcare professional's instructions.      Shock Wave Lithotripsy  Passing a kidney stone can be very painful. Shock wave lithotripsy is a treatment that helps by breaking the kidney stone into smaller pieces that are easier to pass. This treatment is also called extracorporeal shock wave lithotripsy (ESWL). Lithotripsy takes about an hour. Its done in a hospital, lithotripsy center, or mobile lithotripsy van. You will likely go home the same day. This treatment is not used for all types of kidney stones. Your healthcare provider will discuss whether this is the right treatment for the type of stone you have.      Energy waves strike the stone, which begins to crack. The stone crumbles into tiny pieces.   During the procedure  · You get medicine to prevent pain and help you relax or sleep during lithotripsy. Once this takes effect, the procedure will start.  · A flexible tube (stent) with holes in it may be placed into your ureter, the tube that connects the kidney and the bladder. This helps keep urine flowing from the kidney.  · Your healthcare provider then uses X-ray or ultrasound to find the exact location of the kidney stone.  · Sound waves are aimed at the stone and sent at high speed. If youre awake, you may feel a tapping as they pass through your body.  After the procedure  · Youll be closely watched in a recovery room for about 1 to 3 hours. Antibiotics and pain medicine may be prescribed before you leave.  · Youll have a follow-up visit in a few weeks. If you  received a stent, it will be removed. Your healthcare provider will also check for pieces of stone. If large pieces remain, you may need a second lithotripsy or another procedure.  Possible risks and complications  · Infection  · Bleeding in the kidney  · Bruising of the kidney or skin  · Blockage (obstruction) of the ureter  · Failure to break up the stone (other procedures may be needed)   Passing the stone  It can take a day to several weeks for the pieces of stone to leave your body. Drink plenty of liquids to help flush your system. During this time:  · Your urine may be cloudy or slightly bloody. You may even see small pieces of stone.  · You may have a slight fever and some pain. Take prescribed or over-the-counter pain medicine as instructed by your healthcare provider.  · You may be asked to strain your urine to collect some stone particles. These will be studied in the lab.  When to call your healthcare provider   Call your healthcare provider right away if you have any of the following:  · Fever of 100.4°F (38°C) or higher, or as directed by your healthcare provider  · Heavy bleeding  · Pain that doesnt go away with medicine  · Upset stomach and vomiting  · Problems urinating   Date Last Reviewed: 1/1/2017  © 8678-0441 [employer]. [address] [place] [address], [place] [address]. All rights reserved. This information is not intended as a substitute for professional medical care. Always follow your healthcare professional's instructions.      General Information:    1.  Do not drink alcoholic beverages including beer for 24 hours or as long as you are on pain medication..  2.  Do not drive a motor vehicle, operate machinery or power tools, or signs legal papers for 24 hours or as long as you are on pain medication.   3.  You may experience light-headedness, dizziness, and sleepiness following surgery. Please do not stay alone. A responsible adult should be with you for this 24 hour period.  4.  Go  home and rest.    5. Progress slowly to a normal diet unless instructed.  Otherwise, begin with liquids such as soft drinks, then soup and crackers working up to solid foods. Drink plenty of nonalcoholic fluids.  6.  Certain anesthetics and pain medications produce nausea and vomiting in certain       individuals. If nausea becomes a problem at home, call you doctor.    7. A nurse will be calling you sometime after surgery. Do not be alarmed. This is our way of finding out how you are doing.    8. Several times every hour while you are awake, take 2-3 deep breaths and cough. If you had stomach surgery hold a pillow or rolled towel firmly against your stomach before you cough. This will help with any pain the cough might cause.  9. Several times every hour while you are awake, pump and flex your feet 5-6 times and do foot circles. This will help prevent blood clots.    10.Call your doctor for severe pain, bleeding, fever, or signs or symptoms of infection (pain, swelling, redness, foul odor, drainage).    Discharge Instructions: After Your Surgery/Procedure  Youve just had surgery. During surgery you were given medicine called anesthesia to keep you relaxed and free of pain. After surgery you may have some pain or nausea. This is common. Here are some tips for feeling better and getting well after surgery.     Stay on schedule with your medication.   Going home  Your doctor or nurse will show you how to take care of yourself when you go home. He or she will also answer your questions. Have an adult family member or friend drive you home.      For your safety we recommend these precaution for the first 24 hours after your procedure:  · Do not drive or use heavy equipment.  · Do not make important decisions or sign legal papers.  · Do not drink alcohol.  · Have someone stay with you, if needed. He or she can watch for problems and help keep you safe.  · Your concentration, balance, coordination, and judgement may be  "impaired for many hours after anesthesia.  Use caution when ambulating or standing up.     · You may feel weak and "washed out" after anesthesia and surgery.      Subtle residual effects of general anesthesia or sedation with regional / local anesthesia can last more than 24 hours.  Rest for the remainder of the day or longer if your Doctor/Surgeon has advised you to do so.  Although you may feel normal within the first 24 hours, your reflexes and mental ability may be impaired without you realizing it.  You may feel dizzy, lightheaded or sleepy for 24 hours or longer.      Be sure to go to all follow-up visits with your doctor. And rest after your surgery for as long as your doctor tells you to.  Coping with pain  If you have pain after surgery, pain medicine will help you feel better. Take it as told, before pain becomes severe. Also, ask your doctor or pharmacist about other ways to control pain. This might be with heat, ice, or relaxation. And follow any other instructions your surgeon or nurse gives you.  Tips for taking pain medicine  To get the best relief possible, remember these points:  · Pain medicines can upset your stomach. Taking them with a little food may help.  · Most pain relievers taken by mouth need at least 20 to 30 minutes to start to work.  · Taking medicine on a schedule can help you remember to take it. Try to time your medicine so that you can take it before starting an activity. This might be before you get dressed, go for a walk, or sit down for dinner.  · Constipation is a common side effect of pain medicines. Call your doctor before taking any medicines such as laxatives or stool softeners to help ease constipation. Also ask if you should skip any foods. Drinking lots of fluids and eating foods such as fruits and vegetables that are high in fiber can also help. Remember, do not take laxatives unless your surgeon has prescribed them.  · Drinking alcohol and taking pain medicine can cause " dizziness and slow your breathing. It can even be deadly. Do not drink alcohol while taking pain medicine.  · Pain medicine can make you react more slowly to things. Do not drive or run machinery while taking pain medicine.  Your health care provider may tell you to take acetaminophen to help ease your pain. Ask him or her how much you are supposed to take each day. Acetaminophen or other pain relievers may interact with your prescription medicines or other over-the-counter (OTC) drugs. Some prescription medicines have acetaminophen and other ingredients. Using both prescription and OTC acetaminophen for pain can cause you to overdose. Read the labels on your OTC medicines with care. This will help you to clearly know the list of ingredients, how much to take, and any warnings. It may also help you not take too much acetaminophen. If you have questions or do not understand the information, ask your pharmacist or health care provider to explain it to you before you take the OTC medicine.  Managing nausea  Some people have an upset stomach after surgery. This is often because of anesthesia, pain, or pain medicine, or the stress of surgery. These tips will help you handle nausea and eat healthy foods as you get better. If you were on a special food plan before surgery, ask your doctor if you should follow it while you get better. These tips may help:  · Do not push yourself to eat. Your body will tell you when to eat and how much.  · Start off with clear liquids and soup. They are easier to digest.  · Next try semi-solid foods, such as mashed potatoes, applesauce, and gelatin, as you feel ready.  · Slowly move to solid foods. Dont eat fatty, rich, or spicy foods at first.  · Do not force yourself to have 3 large meals a day. Instead eat smaller amounts more often.  · Take pain medicines with a small amount of solid food, such as crackers or toast, to avoid nausea.     Call your surgeon if  · You still have pain an  hour after taking medicine. The medicine may not be strong enough.  · You feel too sleepy, dizzy, or groggy. The medicine may be too strong.  · You have side effects like nausea, vomiting, or skin changes, such as rash, itching, or hives.       If you have obstructive sleep apnea  You were given anesthesia medicine during surgery to keep you comfortable and free of pain. After surgery, you may have more apnea spells because of this medicine and other medicines you were given. The spells may last longer than usual.   At home:  · Keep using the continuous positive airway pressure (CPAP) device when you sleep. Unless your health care provider tells you not to, use it when you sleep, day or night. CPAP is a common device used to treat obstructive sleep apnea.  · Talk with your provider before taking any pain medicine, muscle relaxants, or sedatives. Your provider will tell you about the possible dangers of taking these medicines.  © 0667-9112 [employer]. [address] [place] [address], [place] [address]. All rights reserved. This information is not intended as a substitute for professional medical care. Always follow your healthcare professional's instructions.    Post op instructions for prevention of DVT  What is deep vein thrombosis?  Deep vein thrombosis (DVT) is the medical term for blood clots in the deep veins of the leg.  These blood clots can be dangerous.  A DVT can block a blood vessel and keep blood from getting where it needs to go.  Another problem is that the clot can travel to other parts of the body such as the lungs.  A clot that travels to the lungs is called a pulmonary embolus (PE) and can cause serious problems with breathing which can lead to death.  Am I at risk for DVT/PE?  If you are not very active, you are at risk of DVT.  Anyone confined to bed, sitting for long periods of time, recovering from surgery, etc. increases the risk of DVT.  Other risk factors are cancer diagnosis, certain  medications, estrogen replacement in any form,older age, obesity, pregnancy, smoking, history of clotting disorders, and dehydration.  How will I know if I have a DVT?   Swelling in the lower leg   Pain   Warmth, redness, hardness or bulging of the vein  If you have any of these symptoms, call your doctors office right away.  Some people will not have any symptoms until the clot moves to the lungs.  What are the symptoms of a PE?   Panting, shortness of breath, or trouble breathing   Sharp, knife-like chest pain when you breathe   Coughing or coughing up blood   Rapid heartbeat  If you have any of these symptoms or get worse quickly, call 911 for emergency treatment.  How can I prevent a DVT?   Avoid long periods of inactivity and dont cross your legs--get up and walk around every hour or so.   Stay active--walking after surgery is highly encouraged.  This means you should get out of the house and walk in the neighborhood.  Going up and down stairs will not impair healing (unless advised against such activity by your doctor).     Drink plenty of noncaffeinated, nonalcoholic fluids each day to prevent dehydration.   Wear special support stockings, if they have been advised by your doctor.   If you travel, stop at least once an hour and walk around.   Avoid smoking (assistance with stopping is available through your healthcare provider)  Always notify your doctor if you are not able to follow the post operative instructions that are given to you at the time of discharge.  It may be necessary to prescribe one of the medications available to prevent DVT.    Using Opioids for Pain Management     Your doctor has given instructions for you to take an opioid.  This is a drug for bad pain.  It helps control pain without causing bleeding and kidney problems.  Common opioid names are morphine, hydromorphone, oxycodone, and methadone. These drugs are called narcotics.    There are several safety concerns you need  to know.     · It is against the law to give or sell this drug to another person.  You must keep this medicine safely locked.    · You may have side effects from taking this medication.  These include nausea, itching, sweating, sleepiness, a change in your ability to breathe, and depression.  · Do not take alcohol or sleeping pills opioids.    · Long-term opoid use may no longer giver you relief from pain.  It can cause you stomach pain, mental anxiety, and headaches.  Long-term opoid use can potentially lead to unlawful street drug abuse and reduce your ability to stay employed.    · Your body may become opioid tolerant if you need to take more to get relief.    · You must stop taking opioids if you begin having more pain as a result of the medicine.    · Opioid withdrawal occurs when you have to stop taking the drug.  It can cause you to have nausea, vomiting, diarrhea, stomach pain, anxiety, and dilated pupils in your eyes. This condition means you are opioid dependent.    · Addiction is a drug induced brain disease. It means there are changes in how your brain is working.  Children, teens, and young adults under 25 years old are more likely to get addicted to opioids.      · Addiction can happen with repeated opioid use.  It does not happen with short-term use of two weeks or less.       For more information, please speak with your doctor or pharmacist.      We hope your stay was comfortable as you heal now, mend and rest.    For we have enjoyed taking care of you by giving your our best.    And as you get better, by regaining your health and strength;   We count it as a privilege to have served you and hope your time at [facility] was well spent.      Thank  You!!!

## 2018-11-01 NOTE — PLAN OF CARE
Discharge instructions given to the the pt and her son  Pt is calm,  Denies nausea  All questions answered

## 2018-11-01 NOTE — TRANSFER OF CARE
"Anesthesia Transfer of Care Note    Patient: Khalida Mckeon    Procedure(s) Performed: Procedure(s) (LRB):  REMOVAL, CALCULUS, URETER (Right)  CYSTOSCOPY, WITH RETROGRADE PYELOGRAM AND URETERAL STENT INSERTION (Right)  LITHOTRIPSY, USING LASER (Right)    Patient location: PACU    Anesthesia Type: general    Transport from OR: Transported from OR on 2-3 L/min O2 by NC with adequate spontaneous ventilation    Post pain: adequate analgesia    Post assessment: no apparent anesthetic complications    Post vital signs: stable    Level of consciousness: awake    Nausea/Vomiting: no nausea/vomiting    Complications: none    Transfer of care protocol was followed      Last vitals:   Visit Vitals  /67 (BP Location: Left arm, Patient Position: Lying)   Pulse (!) 56   Temp 36.5 °C (97.7 °F) (Skin)   Resp 14   Ht 5' 2" (1.575 m)   Wt 51.3 kg (113 lb)   SpO2 98%   Breastfeeding? No   BMI 20.67 kg/m²     "

## 2018-11-01 NOTE — PLAN OF CARE
"Released from pacu per anesthesia to  Phase 2 v /s wnl pain 2/10 controlled and tolerable " skin w+d  Due to void no co of nausea No vomit  Report to yojana Russell   "

## 2018-11-01 NOTE — BRIEF OP NOTE
[facility] Urology  Brief Operative/Discharge Note    Date: 11/01/2018    Staff Surgeon: Chandler Pritchard MD    Pre-Op Diagnosis: right renal calculus    Post-Op Diagnosis: same    Procedure(s) Performed:   Right flexible nephroureteroscopy with holmium laser lithotripsy and stone basket extraction  Cystoscopy with right ureteral stent exchange (remove 6x26, place 6x24)  Right retrograde pyelogram  Flouroscopy < 1 hour  Interpretation of flouroscopic images    Specimen(s): stone for chemical analysis    Anesthesia: General LMA anesthesia    Findings: 1.2cm stone mid pole dusted with some pieces extracted, mild collecting system dilation, patent UPJ    Estimated Blood Loss: minimal    Drains: right ureteral stent as above    Complications: none    Disposition: pacu    Discharge home today status post uncomplicated procedure as above  Diet - resume home diet  Follow up: tues 11/13 in PM at [facility] for cysto/stent removal  Instructions: drink plenty of water, continue flomax until stent removed, take antibiotics as directed  Meds:     Medication List      START taking these medications    sulfamethoxazole-trimethoprim 800-160mg 800-160 mg Tab  Commonly known as:  BACTRIM DS  Take 1 tablet by mouth 2 (two) times daily. for 5 days        CONTINUE taking these medications    * biotin 1 mg Cap     * BIOTIN ORAL     * cholecalciferol (vitamin D3) 2,000 unit Tab  Commonly known as:  VITAMIN D3     * VITAMIN D3 1,000 unit capsule  Generic drug:  cholecalciferol (vitamin D3)     citalopram 20 MG tablet  Commonly known as:  CELEXA     fish oil-omega-3 fatty acids 300-1,000 mg capsule     HYDROcodone-acetaminophen 5-325 mg per tablet  Commonly known as:  NORCO  Take 1 tablet by mouth every 6 (six) hours as needed for Pain.     levothyroxine 75 MCG tablet  Commonly known as:  SYNTHROID     tamsulosin 0.4 mg Cap  Commonly known as:  FLOMAX  Take 1 capsule (0.4 mg total) by mouth once daily.         * This list has 4 medication(s) that  are the same as other medications prescribed for you. Read the directions carefully, and ask your doctor or other care provider to review them with you.               Where to Get Your Medications      These medications were sent to [facility] [place] - [address]  [address] [address]    Phone:  [phone]   · HYDROcodone-acetaminophen 5-325 mg per tablet  · sulfamethoxazole-trimethoprim 800-160mg 800-160 mg Tab  · tamsulosin 0.4 mg Cap

## 2018-11-01 NOTE — INTERVAL H&P NOTE
The patient has been examined and the H&P has been reviewed:    No changes to above  Reviewed plan and answered all questions in preop holding    Anesthesia/Surgery risks, benefits and alternative options discussed and understood by patient/family.          Active Hospital Problems    Diagnosis  POA    Renal calculus, right [N20.0]  Yes      Resolved Hospital Problems   No resolved problems to display.

## 2018-11-01 NOTE — ANESTHESIA POSTPROCEDURE EVALUATION
"Anesthesia Post Evaluation    Patient: Khalida Mckeon    Procedure(s) Performed: Procedure(s) (LRB):  REMOVAL, CALCULUS, URETER (Right)  CYSTOSCOPY, WITH RETROGRADE PYELOGRAM AND URETERAL STENT INSERTION (Right)  LITHOTRIPSY, USING LASER (Right)    Final Anesthesia Type: general  Patient location during evaluation: PACU  Patient participation: Yes- Able to Participate  Level of consciousness: awake and alert, oriented and awake  Post-procedure vital signs: reviewed and stable  Pain management: adequate  Airway patency: patent  PONV status at discharge: No PONV  Anesthetic complications: no      Cardiovascular status: blood pressure returned to baseline and hemodynamically stable  Respiratory status: unassisted, spontaneous ventilation and room air  Hydration status: euvolemic  Follow-up not needed.        Visit Vitals  /76   Pulse 63   Temp 36.5 °C (97.7 °F) (Skin)   Resp 16   Ht 5' 2" (1.575 m)   Wt 51.3 kg (113 lb)   SpO2 98%   Breastfeeding? No   BMI 20.67 kg/m²       Pain/Tal Score: Pain Assessment Performed: Yes (11/1/2018  3:00 PM)  Presence of Pain: complains of pain/discomfort (11/1/2018  3:00 PM)  Pain Rating Prior to Med Admin: 5 (11/1/2018  2:15 PM)  Pain Rating Post Med Admin: 3 (11/1/2018  2:15 PM)  Tal Score: 10 (11/1/2018  3:00 PM)        "

## 2018-11-02 VITALS
TEMPERATURE: 98 F | WEIGHT: 113 LBS | RESPIRATION RATE: 16 BRPM | HEART RATE: 63 BPM | DIASTOLIC BLOOD PRESSURE: 76 MMHG | OXYGEN SATURATION: 98 % | BODY MASS INDEX: 20.8 KG/M2 | HEIGHT: 62 IN | SYSTOLIC BLOOD PRESSURE: 127 MMHG

## 2018-11-05 LAB
ANNOTATION COMMENT IMP: NORMAL
COMPN STONE: NORMAL
SPECIMEN SOURCE: NORMAL
STONE ANALYSIS IR-IMP: NORMAL

## 2018-11-05 NOTE — OP NOTE
[facility] Urology Operative Report     Date: 11/01/2018     Staff Surgeon: Chandler Pritchard MD     Pre-Op Diagnosis: right renal calculus     Post-Op Diagnosis: same     Procedure(s) Performed:   Right flexible nephroureteroscopy with holmium laser lithotripsy and stone basket extraction  Cystoscopy with right ureteral stent exchange (remove 6x26, place 6x24)  Right retrograde pyelogram  Flouroscopy < 1 hour  Interpretation of flouroscopic images     Specimen(s): stone for chemical analysis     Anesthesia: General LMA anesthesia     Findings: 1.2cm stone mid pole dusted with some pieces extracted, mild collecting system dilation, patent UPJ     Estimated Blood Loss: minimal     Drains: right ureteral stent as above     Complications: none    Indications for procedure:  Ms Mckeon is a 52 yo F who presented to the emergency department 10/18/18 with abdominal and flank pain, nausea and vomiting, and was found to have a 1.5 cm obstructing proximal ureteral calculus at the ureteropelvic junction, as well as a 7 mm distal obstructing ureteral calculus with moderate hydroureteronephrosis.  Given persistence of pain and nausea despite IV narcotics and antiemetics, as well as her dual obstructing calculi, taken for operative management at that time was able to perform ureteroscopy with lithotripsy to remove the distal stone proximal to which was a very dilated tortuous proximal ureter taking in 180 degree turn to pass the wire into the collecting system with concern for UPJ obstruction verses proximal kinking from more distal obstruction and stent was placed. She returns today for further ureteroscopic and retrograde pyelogram evaluation, and if the ureteropelvic junction is found to be stenosed or narrowed, will replace stent proceed with more definitive repair, however if patent will perform an ureteroscopy with laser lithotripsy.  All risks and benefits of all aforementioned procedures were described in detail and  appropriate informed consent was obtained after all questions were answered.     Procedure in detail:  After appropriate informed consent was obtained, the patient was taken to the operating room and placed in the lithotomy position.  Preoperative antibiotics, gentamicin and Ancef, were given, and a WHO approved time-out was performed.  She was prepped and draped in standard cystoscopic fashion.    On fluoroscopy the stent was found to be in good position with her radiopaque renal calculus adjacent to the proximal curl.  A 21 Czech rigid Olympus cystoscope was passed into the bladder via the urethra and the distal curl of the stent was seen emanating from the right ureteral orifice with a significant portion of stent within the bladder.  A 2 prong grasper was used to grasp the distal end of the stent and bring it to the urethral meatus through through which a [employer] motion guidewire was passed until it was seen to curl in the collecting system.    Over the guidewire, under live fluoroscopic guidance, a 10 Czech dual lumen catheter was passed to the distal ureter through which is superstiff guidewire was passed and seen to curl proximally in the collecting system without any resistance.  The dual lumen catheter was off-loaded and the motion wire was secured to the drape to the hemostat and under live fluoroscopic guidance a 35 cm 12-14 Czech ureteral access sheath was passed into the ureter over the superstiff wire at which time the wire and stylet were discontinued.    A digital flexible ureteroscope was passed into the proximal ureter was able to traverse into the collecting system.  The scope was withdrawn to the proximal ureter and the UPJ was patent and the scope was passed back into the collecting system and flexible nephroscopy was performed until localizing the stone in a midpole calyx after which a 273 micron holmium laser fiber was used to perform holmium laser lithotripsy initially dusting the edges off  the stone, and then dusting it into small passable fragments.  The large radiopaque density cleared on fluoroscopy and stone basket was used to remove any significant fragments, which were also sent for chemical analysis.    The ureteroscope was then withdrawn to the proximal ureter and through the scope, dilute Isovue contrast was injected in a retrograde fashion to perform retrograde pyelogram noting patent see at the ureteropelvic junction with residual mild dilation of the collecting system.  Retrograde pyelogram was then used to map the collecting system for systemic nephroscopy to clear the kidney of any remaining stones or stone burden.  At the site of lithotripsy, small passable fragments of the stone were seen residual from the extensive lithotripsy above.  The stone was sufficiently dusted and therefore the ureteroscope was removed in the systematic fashion with the access sheath keeping a 360 degree view of the ureter all the way distal to the bladder of which no stones were noted in the ureter.    Once the ureteroscope was removed, the guidewire was backloaded through the cystoscope which was then passed back into the bladder to keep direct vision at the right ureteral orifice, and a 6 Khmer by 24 cm double-J ureteral stent was passed over the guidewire into the right ureter using fluoroscopic guidance to confirm the proximal curl, which was also confirmed by residual collecting system contrast from retrograde pyelogram, and direct vision to confirm the distal curl in the bladder. Once the stent was in place, the bladder was drained with the cystoscope sheath.    She tolerated the procedure well, there were no complications, she was awakened and taken to PACU without incident.     Disposition: Home today status post uncomplicated procedure as above.  She will be asked to increase hydration and continue daily Flomax, as well as complete postoperative prophylactic course of antibiotics, and she will  return on  tues 11/13 in PM at [facility] for cysto/stent removal

## 2018-11-09 RX ORDER — HYDROCODONE BITARTRATE AND ACETAMINOPHEN 5; 325 MG/1; MG/1
1 TABLET ORAL EVERY 6 HOURS PRN
Qty: 10 TABLET | Refills: 0 | OUTPATIENT
Start: 2018-11-09

## 2018-11-09 NOTE — TELEPHONE ENCOUNTER
----- Message from Alton Taylor sent at 11/9/2018 10:03 AM CST -----  Contact: self   Patient need a refill on hydrocodone please send to [facility], any questions please call back at [phone] (home)       [facility] - [place] - [address]  [address] [address]  Phone: [phone] Fax: [phone]

## 2018-11-13 ENCOUNTER — HOSPITAL ENCOUNTER (OUTPATIENT)
Facility: AMBULARY SURGERY CENTER | Age: 53
Discharge: HOME | End: 2018-11-13
Attending: UROLOGY | Admitting: UROLOGY
Payer: MEDICAID

## 2018-11-13 DIAGNOSIS — N20.0 RENAL CALCULUS, RIGHT: ICD-10-CM

## 2018-11-13 PROCEDURE — 52310 CYSTOSCOPY AND TREATMENT: CPT | Performed by: UROLOGY

## 2018-11-13 PROCEDURE — 52310 CYSTOSCOPY AND TREATMENT: CPT | Mod: ,,, | Performed by: UROLOGY

## 2018-11-13 RX ORDER — LIDOCAINE HYDROCHLORIDE 20 MG/ML
JELLY TOPICAL ONCE
Status: COMPLETED | OUTPATIENT
Start: 2018-11-13 | End: 2018-11-13

## 2018-11-13 RX ORDER — SULFAMETHOXAZOLE AND TRIMETHOPRIM 800; 160 MG/1; MG/1
1 TABLET ORAL 2 TIMES DAILY
Qty: 4 TABLET | Refills: 0 | Status: SHIPPED | OUTPATIENT
Start: 2018-11-13 | End: 2018-11-15

## 2018-11-13 RX ORDER — LIDOCAINE HYDROCHLORIDE 20 MG/ML
JELLY TOPICAL
Status: DISCONTINUED
Start: 2018-11-13 | End: 2018-11-13 | Stop reason: HOSPADM

## 2018-11-13 RX ORDER — WATER 1000 ML/1000ML
INJECTION, SOLUTION INTRAVENOUS
Status: DISCONTINUED | OUTPATIENT
Start: 2018-11-13 | End: 2018-11-13 | Stop reason: HOSPADM

## 2018-11-13 RX ADMIN — LIDOCAINE HYDROCHLORIDE 5 ML: 20 JELLY TOPICAL at 02:11

## 2018-11-13 NOTE — DISCHARGE INSTRUCTIONS
Cystoscopy    Cystoscopy is a procedure that lets your doctor look directly inside your urethra and bladder. It can be used to:  · Help diagnose a problem with your urethra, bladder, or kidneys.  · Take a sample (biopsy) of bladder or urethral tissue.  · Treat certain problems (such as removing kidney stones).  · Place a stent to bypass an obstruction.  · Take special X-rays of the kidneys.  Based on the findings, your doctor may recommend other tests or treatments.  What is a cystoscope?  A cystoscope is a telescope-like instrument that contains lenses and fiberoptics (small glass wires that make bright light). The cystoscope may be straight and rigid, or flexible to bend around curves in the urethra. The doctor may look directly into the cystoscope, or project the image onto a monitor.  Getting ready  · Ask your doctor if you should stop taking any medicines before the procedure.  · Ask whether you should avoid eating or drinking anything after midnight before the procedure.  · Follow any other instructions your doctor gives you.  Tell your doctor before the exam if you:  · Take any medicines, such as aspirin or blood thinners  · Have allergies to any medicines  · Are pregnant   The procedure  Cystoscopy is done in the doctors office, surgery center, or hospital. The doctor and a nurse are present during the procedure. It takes only a few minutes, longer if a biopsy, X-ray, or treatment needs to be done.  During the procedure:  · You lie on an exam table on your back, knees bent and legs apart. You are covered with a drape.  · Your urethra and the area around it are washed. Anesthetic jelly may be applied to numb the urethra. Other pain medicine is usually not needed. In some cases, you may be offered a mild sedative to help you relax. If a more extensive procedure is to be done, such as a biopsy or kidney stone removal, general anesthesia may be needed.  · The cystoscope is inserted. A sterile fluid is put  into the bladder to expand it. You may feel pressure from this fluid.  · When the procedure is done, the cystoscope is removed.  After the procedure  If you had a sedative, general anesthesia, or spinal anesthesia, you must have someone drive you home. Once youre home:  · Drink plenty of fluids.  · You may have burning or light bleeding when you urinate--this is normal.  · Medicines may be prescribed to ease any discomfort or prevent infection. Take these as directed.  · Call your doctor if you have heavy bleeding or blood clots, burning that lasts more than a day, a fever over 100°F  (38° C), or trouble urinating.  Date Last Reviewed: 1/1/2017  © 4955-1908 [employer]. [address] [place] [address], [place] [address]. All rights reserved. This information is not intended as a substitute for professional medical care. Always follow your healthcare professional's instructions.

## 2018-11-13 NOTE — INTERVAL H&P NOTE
The patient has been examined and the H&P has been reviewed:    11/1/18 R URS/HLL of 1.2cm stone mid pole dusted with some pieces extracted, mild collecting system dilation, patent UPJ  Here for stent removal  Pt is acceptable candidate for procedure at [facility]    Anesthesia/Surgery risks, benefits and alternative options discussed and understood by patient/family.          Active Hospital Problems    Diagnosis  POA    Renal calculus, right [N20.0]  Yes      Resolved Hospital Problems   No resolved problems to display.

## 2018-11-14 VITALS
TEMPERATURE: 98 F | DIASTOLIC BLOOD PRESSURE: 65 MMHG | RESPIRATION RATE: 18 BRPM | WEIGHT: 113 LBS | OXYGEN SATURATION: 98 % | BODY MASS INDEX: 20.8 KG/M2 | HEART RATE: 53 BPM | SYSTOLIC BLOOD PRESSURE: 115 MMHG | HEIGHT: 62 IN

## 2018-11-14 NOTE — OP NOTE
[facility] Urology Operative/Discharge Note     Date: 11/13/2018     Staff Surgeon: Chandler Pritchard MD      Pre-Op Diagnosis: right ureteral stent, s/p right ureteroscopy 11/1/18       Post-Op Diagnosis: same      Procedure(s) Performed:   Cystoscopy with right ureteral stent removal      Specimen(s): none      Anesthesia: local urojet      Findings: ureteral stent, removed      Estimated Blood Loss: none      Drains: none      Complications: none      Indications for procedure:   52 yo F s/p R URS/HLL of distal ureteral stone 10/18/18 and 1.2cm renal pelvic stone on 11/1/18 after finding UPJ patent with previous concern for obstruction. Here today for stent removal. Has done well postop with irritative stent symptoms. Stone mixed CaOx and CaPO4      Procedure in detail:  After informed consent, the patient was prepped and drapped in standard cystoscopic fashion and 2% xylocaine jelly was instilled into the urethral meatus and used to lubricate the cystoscope. A flexible cystoscope was passed into the bladder via the urethra.       Urethra appeared normal without any lesions or narrowings. The distal curl of the right ureteral double J stent was seen emanating from the right ureteral orifice. There was a small stone fragment in bladder adjacent to distal curl of stent.  2-pronged grasper was passed through the scope and used to grasp and remove the stent. Stent was inspected on the back table and found to be completely in tact. Patient tolerated procedure well. There were no complications.       Disposition: home.   Reviewed recommendations for stone prevention such as adequate daily hydration to produce goal daily UOP > 2L, low salt low sodium moderate protein diet, avoiding tea and other oxalate rich foods, avoiding cola, and use of fresh lemon as citrate is a natural stone inhibitor  - has parathyroidectomy scheduled in early december  - RTC 6 mos with KUB and RBUS just prior for surveillance  - as well, complete  24 hour urine litholink prior to follow up - should complete anytime after 6 weeks postop from parathyroidectomy but prior to follow up     Discharge home today status post uncomplicated procedure as above  Diet - resume home diet, no tea/cola, low oxalate, low salt, increase water, increase citrus  Follow up: 6 mos with KUB/RBUS/24h urine prior  Instructions: drink plenty of water, may see blood in urine, take abx as directed  Meds:     Medication List      START taking these medications    sulfamethoxazole-trimethoprim 800-160mg 800-160 mg Tab  Commonly known as:  BACTRIM DS  Take 1 tablet by mouth 2 (two) times daily. for 2 days        CONTINUE taking these medications    biotin 1 mg Cap     cholecalciferol (vitamin D3) 2,000 unit Tab  Commonly known as:  VITAMIN D3     citalopram 20 MG tablet  Commonly known as:  CELEXA     fish oil-omega-3 fatty acids 300-1,000 mg capsule     levothyroxine 75 MCG tablet  Commonly known as:  SYNTHROID        STOP taking these medications    tamsulosin 0.4 mg Cap  Commonly known as:  FLOMAX           Where to Get Your Medications      These medications were sent to [facility] - [place] - [address]  [address] [address]    Phone:  [phone]   · sulfamethoxazole-trimethoprim 800-160mg 800-160 mg Tab

## 2019-05-15 ENCOUNTER — HOSPITAL ENCOUNTER (OUTPATIENT)
Dept: RADIOLOGY | Facility: HOSPITAL | Age: 54
Discharge: HOME | End: 2019-05-15
Attending: UROLOGY
Payer: MEDICAID

## 2019-05-15 ENCOUNTER — TELEPHONE (OUTPATIENT)
Dept: UROLOGY | Facility: CLINIC | Age: 54
End: 2019-05-15

## 2019-05-15 DIAGNOSIS — N20.0 RENAL CALCULUS, RIGHT: ICD-10-CM

## 2019-05-15 PROCEDURE — 74018 RADEX ABDOMEN 1 VIEW: CPT | Mod: 26,,, | Performed by: RADIOLOGY

## 2019-05-15 PROCEDURE — 76770 US EXAM ABDO BACK WALL COMP: CPT | Mod: 26,,, | Performed by: RADIOLOGY

## 2019-05-15 PROCEDURE — 74018 RADEX ABDOMEN 1 VIEW: CPT | Mod: TC,FY

## 2019-05-15 PROCEDURE — 76770 US EXAM ABDO BACK WALL COMP: CPT | Mod: TC

## 2019-05-15 PROCEDURE — 74018 XR ABDOMEN AP 1 VIEW: ICD-10-PCS | Mod: 26,,, | Performed by: RADIOLOGY

## 2019-05-15 PROCEDURE — 76770 US RETROPERITONEAL COMPLETE: ICD-10-PCS | Mod: 26,,, | Performed by: RADIOLOGY

## 2019-05-15 NOTE — TELEPHONE ENCOUNTER
Discharge home today status post uncomplicated procedure as above  Diet - resume home diet, no tea/cola, low oxalate, low salt, increase water, increase citrus  Follow up: 6 mos with KUB/RBUS/24h urine prior  Instructions: drink plenty of water, may see blood in urine, take abx as directed  Meds:    Spoke w pt wants to reschedule appt states she done all labs above except for 24 hr urine pt was reminded to performe 24 hr urine and send in will need to oush appt back a few weeks. Cant schedule due to pt is medicaid

## 2019-05-15 NOTE — TELEPHONE ENCOUNTER
----- Message from Bety Dixon sent at 5/15/2019 11:16 AM CDT -----  Contact: patient  Type: Needs Medical Advice    Who Called:  patient  Best Call Back Number: [phone]  Additional Information: patient calling to reschedule her appointment. I was unable to do it. Please give call back

## 2019-05-31 NOTE — TELEPHONE ENCOUNTER
Can book this routine fu with cande in a month with imaging and 24h urine done prior  Should be advised that will provide recs at that visit and then can refer to [facility] urology.

## 2019-07-03 NOTE — TELEPHONE ENCOUNTER
Left detailed message of scheduled appt and time with Lauryn Rodriges message was keft to call office with confirmation of appt time received and if can make appt.

## 2019-07-17 ENCOUNTER — OFFICE VISIT (OUTPATIENT)
Dept: UROLOGY | Facility: CLINIC | Age: 54
End: 2019-07-17
Payer: MEDICAID

## 2019-07-17 VITALS
TEMPERATURE: 98 F | HEART RATE: 65 BPM | WEIGHT: 118.81 LBS | DIASTOLIC BLOOD PRESSURE: 79 MMHG | RESPIRATION RATE: 18 BRPM | SYSTOLIC BLOOD PRESSURE: 125 MMHG | HEIGHT: 62 IN | BODY MASS INDEX: 21.86 KG/M2

## 2019-07-17 DIAGNOSIS — N20.0 NEPHROLITHIASIS: Primary | ICD-10-CM

## 2019-07-17 PROCEDURE — 99213 PR OFFICE/OUTPT VISIT, EST, LEVL III, 20-29 MIN: ICD-10-PCS | Mod: S$PBB,,, | Performed by: NURSE PRACTITIONER

## 2019-07-17 PROCEDURE — 99999 PR PBB SHADOW E&M-EST. PATIENT-LVL III: CPT | Mod: PBBFAC,,, | Performed by: NURSE PRACTITIONER

## 2019-07-17 PROCEDURE — 99213 OFFICE O/P EST LOW 20 MIN: CPT | Mod: PBBFAC,PN | Performed by: NURSE PRACTITIONER

## 2019-07-17 PROCEDURE — 99999 PR PBB SHADOW E&M-EST. PATIENT-LVL III: ICD-10-PCS | Mod: PBBFAC,,, | Performed by: NURSE PRACTITIONER

## 2019-07-17 PROCEDURE — 99213 OFFICE O/P EST LOW 20 MIN: CPT | Mod: S$PBB,,, | Performed by: NURSE PRACTITIONER

## 2019-07-17 NOTE — PROGRESS NOTES
[facility] Clinic Note  Staff: MORENO Angel    PCP: Isiah Baltazar M.D.  Endocrinologist:  Dr. J Luis Reyna    Chief Complaint: Follow-up-Kidney stones    Subjective:        HPI: Khalida Mckeon is a 54 y.o. female presents today for follow-up in regards to hx of kidney stones.  (*Please note pt did NOT do 24-hr Urine test as of today's office visit)    The pt was last seen by Dr. Pritchard on 11/13/18 for Cysto with right ureteral stent removal.  53 yo F s/p R URS/HLL of distal ureteral stone 10/18/18 and 1.2cm renal pelvic stone on 11/1/18 after finding UPJ patent with previous concern for obstruction. Here today for stent removal. Has done well postop with irritative stent symptoms. Stone mixed CaOx and CaPO4    Medical Status Changes since last OV with MD:  On 12/10/18-Pt had Parathyroidectomy done by Dr. Raul Davison and Dr. Chandler Fuchs at [facility].  Diagnosis was due to Left inferior parathyroid adenoma.  Operative Findings:  NP/OP: no masses/lesions of NC, eustachian tube, fossa of Rosenmuller, no adenoid hypertrophy  BOT/vallecula: no lingual hypertrophy, no masses/lesions, no secretions obscuring visualization  Piriform sinuses/post-cricoid: no masses/lesions, no pooling of secretions  Epiglottis: lingual and laryngeal surfaces within normal limits  Arytenoids/FVFs: no masses/lesions, no edema, bilateral mobility  TVCs: bilateral cord mobility, no masses or lesions  No aspiration, no pooled secretions  No sign of malignancy     Recent Imaging:  KUB XR:  IMPRESSION:  No evidence of stones.    RBUS done 5/15/19:  IMPRESSION:  No obstructive uropathy.  Simple left renal cyst.  Elevated renal resistive indices.  While nonspecific this can indicate medical renal disease.    REVIEW OF SYSTEMS:  A comprehensive 10 system review was performed and is negative except as noted above in HPI    PMHx:  Past Medical History:   Diagnosis Date    Hyperparathyroidism     Kidney  stone     Migraines     Thyroid disease      PSHx:  Past Surgical History:   Procedure Laterality Date    CYSTOSCOPY, WITH RETROGRADE PYELOGRAM AND URETERAL STENT INSERTION Right 11/1/2018    [facility]    CYSTOSCOPY, WITH RETROGRADE PYELOGRAM AND URETERAL STENT INSERTION Right 10/18/2018    [facility] at [facility] OR    CYSTOSCOPY, WITH URETERAL STENT REMOVAL Right 11/13/2018    [facility]    LITHOTRIPSY, USING LASER Right 11/1/2018    [facility] at [facility]    LITHOTRIPSY, USING LASER Right 10/18/2018    [facility] at [facility]    REMOVAL, CALCULUS, URETER Right 11/1/2018    [facility] by [facility] at [facility] OR    REMOVAL, CALCULUS, URETER, URETEROSCOPIC Right 10/18/2018    [facility] by [facility]chard [facility] at [facility] OR    TUBAL LIGATION       Allergies:  Patient has no known allergies.    Medications: reviewed   Objective:     Vitals:    07/17/19 1435   BP: 125/79   Pulse: 65   Resp: 18   Temp: 98.3 °F (36.8 °C)     Physical Exam   Vitals reviewed.  Constitutional: She is oriented to person, place, and time. She appears well-developed and well-nourished.   HENT:   Head: Normocephalic and atraumatic.   Eyes: Conjunctivae and EOM are normal. Pupils are equal, round, and reactive to light.   Neck: Normal range of motion. Neck supple.   Cardiovascular: Normal rate, regular rhythm, normal heart sounds and intact distal pulses.    Pulmonary/Chest: Effort normal and breath sounds normal.   Abdominal: Soft. Bowel sounds are normal.   Musculoskeletal: Normal range of motion.   Neurological: She is alert and oriented to person, place, and time. She has normal reflexes.   Skin: Skin is warm and dry.     Psychiatric: She has a normal mood and affect. Her behavior is normal. Judgment and thought content normal.     Assessment:       1. Nephrolithiasis          Plan:   Kidney  stones:    Litholink 24-Hr urine Orders and information re-distributed to the patient after office visit today to have done within next few months.      F/u--We will contact the pt after we receive her 24-hr urine results and/or as needed.    [facility]: Bj Nolan, AIDANP-C

## 2019-12-21 ENCOUNTER — CLINICAL SUPPORT (OUTPATIENT)
Dept: URGENT CARE | Facility: CLINIC | Age: 54
End: 2019-12-21
Payer: MEDICAID

## 2019-12-21 VITALS
HEIGHT: 62 IN | WEIGHT: 121.63 LBS | SYSTOLIC BLOOD PRESSURE: 132 MMHG | BODY MASS INDEX: 22.38 KG/M2 | RESPIRATION RATE: 16 BRPM | TEMPERATURE: 98 F | DIASTOLIC BLOOD PRESSURE: 83 MMHG | HEART RATE: 68 BPM

## 2019-12-21 DIAGNOSIS — N30.01 ACUTE CYSTITIS WITH HEMATURIA: Primary | ICD-10-CM

## 2019-12-21 DIAGNOSIS — R30.0 DYSURIA: ICD-10-CM

## 2019-12-21 LAB
BILIRUB UR QL STRIP: NEGATIVE
GLUCOSE UR QL STRIP: NEGATIVE
KETONES UR QL STRIP: NEGATIVE
LEUKOCYTE ESTERASE UR QL STRIP: POSITIVE
PH, POC UA: 8
POC BLOOD, URINE: POSITIVE
POC NITRATES, URINE: POSITIVE
PROT UR QL STRIP: POSITIVE
SP GR UR STRIP: 1.01 (ref 1–1.03)
UROBILINOGEN UR STRIP-ACNC: NORMAL (ref 0.1–1.1)

## 2019-12-21 PROCEDURE — 99204 OFFICE O/P NEW MOD 45 MIN: CPT | Mod: 25,S$GLB,, | Performed by: NURSE PRACTITIONER

## 2019-12-21 PROCEDURE — 99204 PR OFFICE/OUTPT VISIT, NEW, LEVL IV, 45-59 MIN: ICD-10-PCS | Mod: 25,S$GLB,, | Performed by: NURSE PRACTITIONER

## 2019-12-21 PROCEDURE — 81003 POCT URINALYSIS, DIPSTICK, AUTOMATED, W/O SCOPE: ICD-10-PCS | Mod: QW,S$GLB,, | Performed by: NURSE PRACTITIONER

## 2019-12-21 PROCEDURE — 81003 URINALYSIS AUTO W/O SCOPE: CPT | Mod: QW,S$GLB,, | Performed by: NURSE PRACTITIONER

## 2019-12-21 RX ORDER — PHENAZOPYRIDINE HYDROCHLORIDE 200 MG/1
200 TABLET, FILM COATED ORAL 3 TIMES DAILY PRN
Qty: 6 TABLET | Refills: 0 | Status: SHIPPED | OUTPATIENT
Start: 2019-12-21 | End: 2019-12-21

## 2019-12-21 RX ORDER — NITROFURANTOIN 25; 75 MG/1; MG/1
100 CAPSULE ORAL 2 TIMES DAILY
Qty: 14 CAPSULE | Refills: 0 | Status: SHIPPED | OUTPATIENT
Start: 2019-12-21 | End: 2019-12-21

## 2019-12-21 RX ORDER — PHENAZOPYRIDINE HYDROCHLORIDE 200 MG/1
200 TABLET, FILM COATED ORAL 3 TIMES DAILY PRN
Qty: 6 TABLET | Refills: 0 | Status: SHIPPED | OUTPATIENT
Start: 2019-12-21 | End: 2019-12-23

## 2019-12-21 RX ORDER — NITROFURANTOIN 25; 75 MG/1; MG/1
100 CAPSULE ORAL 2 TIMES DAILY
Qty: 14 CAPSULE | Refills: 0 | Status: SHIPPED | OUTPATIENT
Start: 2019-12-21 | End: 2019-12-28

## 2019-12-21 NOTE — PROGRESS NOTES
"Subjective:       Patient ID: Khalida Mckeon is a 54 y.o. female.    Vitals:  height is 5' 2" (1.575 m) and weight is 55.2 kg (121 lb 9.6 oz). Her oral temperature is 97.7 °F (36.5 °C). Her blood pressure is 132/83 and her pulse is 68. Her respiration is 16.     Chief Complaint: Urinary Tract Infection (X 2 days w/urgency, some burning, not emptying bladder and odor)    HPI    Constitution: Negative for chills and fever.   Neck: Negative for painful lymph nodes.   Gastrointestinal: Negative for abdominal pain, nausea and vomiting.   Genitourinary: Positive for dysuria, frequency, urgency and vaginal odor (urine odor). Negative for urine decreased, hematuria, history of kidney stones, painful menstruation, irregular menstruation, missed menses, heavy menstrual bleeding, ovarian cysts, genital trauma, vaginal pain, vaginal discharge, vaginal bleeding, painful intercourse, genital sore, painful ejaculation and pelvic pain.   Musculoskeletal: Negative for back pain.   Skin: Negative for rash and lesion.   Hematologic/Lymphatic: Negative for swollen lymph nodes.       Objective:      Physical Exam   Constitutional: She is oriented to person, place, and time. She appears well-developed and well-nourished.   HENT:   Head: Normocephalic and atraumatic.   Right Ear: External ear normal.   Left Ear: External ear normal.   Nose: Nose normal. No nasal deformity. No epistaxis.   Mouth/Throat: Oropharynx is clear and moist and mucous membranes are normal.   Eyes: Lids are normal.   Neck: Trachea normal, normal range of motion and phonation normal. Neck supple.   Cardiovascular: Normal pulses.   Pulmonary/Chest: Effort normal.   Abdominal: Soft. Normal appearance and bowel sounds are normal. She exhibits no distension. There is no tenderness. There is no CVA tenderness.   Neurological: She is alert and oriented to person, place, and time.   Skin: Skin is warm, dry and intact.   Psychiatric: She has a normal mood and affect. Her " speech is normal and behavior is normal. Cognition and memory are normal.   Nursing note and vitals reviewed.        Assessment:       1. Acute cystitis with hematuria    2. Dysuria        Plan:         Acute cystitis with hematuria    Dysuria  -     POCT Urinalysis, Dipstick, Automated, W/O Scope  -     Culture, Urine    Other orders  -     Discontinue: phenazopyridine (PYRIDIUM) 200 MG tablet; Take 1 tablet (200 mg total) by mouth 3 (three) times daily as needed for Pain.  Dispense: 6 tablet; Refill: 0  -     Discontinue: nitrofurantoin, macrocrystal-monohydrate, (MACROBID) 100 MG capsule; Take 1 capsule (100 mg total) by mouth 2 (two) times daily. for 7 days  Dispense: 14 capsule; Refill: 0  -     nitrofurantoin, macrocrystal-monohydrate, (MACROBID) 100 MG capsule; Take 1 capsule (100 mg total) by mouth 2 (two) times daily. for 7 days  Dispense: 14 capsule; Refill: 0  -     phenazopyridine (PYRIDIUM) 200 MG tablet; Take 1 tablet (200 mg total) by mouth 3 (three) times daily as needed for Pain.  Dispense: 6 tablet; Refill: 0

## 2019-12-21 NOTE — PATIENT INSTRUCTIONS
"  Dysuria     Painful urination (dysuria) is often caused by a problem in the urinary tract.   Dysuria is pain felt during urination. It is often described as a burning. Learn more about this problem and how it can be treated.  What causes dysuria?  Possible causes include:  · Infection with a bacteria or virus such as a urinary tract infection (UTI or a sexually transmitted infection (STI)  · Sensitivity or allergy to chemicals such as those found in lotions and other products  · Prostate or bladder problems  · Radiation therapy to the pelvic area  How is dysuria diagnosed?  Your healthcare provider will examine you. He or she will ask about your symptoms and health. After talking with you and doing a physical exam, your healthcare provider may know what is causing your dysuria. He or she will usually request  a sample of your urine. Tests of your urine, or a "urinalysis," are done. A urinalysis may include:  · Looking at the urine sample (visual exam)  · Checking for substances (chemical exam)  · Looking at a small amount under a microscope (microscopic exam)  Some parts of the urinalysis may be done in the provider's office and some in a lab. And, the urine sample may be checked for bacteria and yeast (urine culture). Your healthcare provider will tell you more about these tests if they are needed.  How is dysuria treated?  Treatment depends on the cause. If you have a bacterial infection, you may need antibiotics. You may be given medicines to make it easier for you to urinate and help relieve pain. Your healthcare provider can tell you more about your treatment options. Untreated, symptoms may get worse.  When to call your healthcare provider  Call the healthcare provider right away if you have any of the following:  · Fever of 100.4°F (38°C) or higher   · No improvement after three days of treatment  · Trouble urinating because of pain  · New or increased discharge from the vagina or penis  · Rash or joint " pain  · Increased back or abdominal pain  · Enlarged painful lymph nodes (lumps) in the groin   Date Last Reviewed: 1/1/2017 © 2000-2017 [employer], [employer]. [address] [place] [address], [place] [address]. All rights reserved. This information is not intended as a substitute for professional medical care. Always follow your healthcare professional's instructions.        Blood in the Urine    Blood in the urine (hematuria) has many possible causes. If it occurs after an injury (such as a car accident or fall), it is most often a sign of bruising to the kidney or bladder. Common causes of blood in the urine include urinary tract infections, kidney stones, inflammation, tumors, or certain other diseases of the kidney or bladder. Menstruation can cause blood to appear in the urine sample, although it is not coming from the urinary tract.  If only a trace amount of blood is present, it will show up on the urine test, even though the urine may be yellow and not pink or red. This may occur with any of the above conditions, as well as heavy exercise or high fever. In this case, your doctor may want to repeat the urine test on another day. This will show if the blood is still present. If it is, then other tests can be done to find out the cause.  Home care  Follow these home care guidelines:  · If your urine does not appear bloody (pink, brown or red) then you do not need to restrict your activity in any way.  · If you can see blood in your urine, rest and avoid heavy exertion until your next exam. Do not use aspirin, blood thinners, or anti-platelet or anti-inflammatory medicines. These include ibuprofen and naproxen. These thin the blood and may increase bleeding.  Follow-up care  Follow up with your healthcare provider, or as advised. If you were injured and had blood in your urine, you should have a repeat urine test in 1 to 2 days. Contact your doctor for this test.  A radiologist will review any X-rays that were  taken. You will be told of any new findings that may affect your care.  When to seek medical advice  Call your healthcare provider right away if any of these occur:  · Bright red blood or blood clots in the urine (if you did not have this before)  · Weakness, dizziness or fainting  · New groin, abdominal, or back pain  · Fever of 100.4ºF (38ºC) or higher, or as directed by your healthcare provider  · Repeated vomiting  · Bleeding from the nose or gums or easy bruising  Date Last Reviewed: 9/1/2016 © 2000-2017 [employer]. [address] [place] [id number]. All rights reserved. This information is not intended as a substitute for professional medical care. Always follow your healthcare professional's instructions.        Understanding Urinary Tract Infections (UTIs)  Most UTIs are caused by bacteria, although they may also be caused by viruses or fungi. Bacteria from the bowel are the most common source of infection. The infection may start because of any of the following:  · Sexual activity. During sex, bacteria can travel from the penis, vagina, or rectum into the urethra.   · Bacteria on the skin outside the rectum may travel into the urethra. This is more common in women since the rectum and urethra are closer to each other than in men. Wiping from front to back after using the toilet and keeping the area clean can help prevent germs from getting to the urethra.  · Blockage of urine flow through the urinary tract. If urine sits too long, germs may start to grow out of control.      Parts of the urinary tract  The infection can occur in any part of the urinary tract.  · The kidneys collect and store urine.  · The ureters carry urine from the kidneys to the bladder.  · The bladder holds urine until you are ready to let it out.  · The urethra carries urine from the bladder out of the body. It is shorter in women, so bacteria can move through it more easily. The urethra is longer in men, so a  UTI is less likely to reach the bladder or kidneys in men.  Date Last Reviewed: 1/1/2017  © 9730-6482 [employer]. [address] [place] [address], [place] [address]. All rights reserved. This information is not intended as a substitute for professional medical care. Always follow your healthcare professional's instructions.

## 2019-12-28 LAB
BACTERIA UR CULT: ABNORMAL
BACTERIA UR CULT: ABNORMAL
OTHER ANTIBIOTIC SUSC ISLT: ABNORMAL

## 2020-01-09 ENCOUNTER — TELEPHONE (OUTPATIENT)
Dept: URGENT CARE | Facility: CLINIC | Age: 55
End: 2020-01-09

## 2020-01-09 NOTE — TELEPHONE ENCOUNTER
----- Message from KEN Verma sent at 1/2/2020  2:57 PM CST -----  Culture results positive, prescribed macrobid, which is appropriate. Please call to notify patient

## 2020-02-08 ENCOUNTER — CLINICAL SUPPORT (OUTPATIENT)
Dept: URGENT CARE | Facility: CLINIC | Age: 55
End: 2020-02-08
Payer: MEDICAID

## 2020-02-08 VITALS
HEART RATE: 66 BPM | HEIGHT: 62 IN | TEMPERATURE: 97 F | WEIGHT: 124.38 LBS | OXYGEN SATURATION: 98 % | BODY MASS INDEX: 22.89 KG/M2 | DIASTOLIC BLOOD PRESSURE: 81 MMHG | SYSTOLIC BLOOD PRESSURE: 123 MMHG

## 2020-02-08 DIAGNOSIS — R30.0 DYSURIA: Primary | ICD-10-CM

## 2020-02-08 DIAGNOSIS — N89.8 VAGINAL DISCHARGE: ICD-10-CM

## 2020-02-08 LAB
BILIRUB UR QL STRIP: NEGATIVE
GLUCOSE UR QL STRIP: NEGATIVE
KETONES UR QL STRIP: NEGATIVE
LEUKOCYTE ESTERASE UR QL STRIP: NEGATIVE
PH, POC UA: 8
POC BLOOD, URINE: NEGATIVE
POC NITRATES, URINE: NEGATIVE
PROT UR QL STRIP: NEGATIVE
SP GR UR STRIP: 1 (ref 1–1.03)
UROBILINOGEN UR STRIP-ACNC: NORMAL (ref 0.1–1.1)

## 2020-02-08 PROCEDURE — 99214 PR OFFICE/OUTPT VISIT, EST, LEVL IV, 30-39 MIN: ICD-10-PCS | Mod: 25,S$GLB,, | Performed by: NURSE PRACTITIONER

## 2020-02-08 PROCEDURE — 81003 URINALYSIS AUTO W/O SCOPE: CPT | Mod: QW,S$GLB,, | Performed by: NURSE PRACTITIONER

## 2020-02-08 PROCEDURE — 99214 OFFICE O/P EST MOD 30 MIN: CPT | Mod: 25,S$GLB,, | Performed by: NURSE PRACTITIONER

## 2020-02-08 PROCEDURE — 81003 POCT URINALYSIS, DIPSTICK, AUTOMATED, W/O SCOPE: ICD-10-PCS | Mod: QW,S$GLB,, | Performed by: NURSE PRACTITIONER

## 2020-02-08 RX ORDER — METRONIDAZOLE 500 MG/1
500 TABLET ORAL EVERY 12 HOURS
Qty: 14 TABLET | Refills: 0 | Status: SHIPPED | OUTPATIENT
Start: 2020-02-08 | End: 2020-02-15

## 2020-02-08 NOTE — PATIENT INSTRUCTIONS

## 2020-02-08 NOTE — PROGRESS NOTES
"Subjective:       Patient ID: Khalida Mckeon is a 54 y.o. female.    Vitals:  height is 5' 2" (1.575 m) and weight is 56.4 kg (124 lb 6.4 oz). Her oral temperature is 97.1 °F (36.2 °C). Her blood pressure is 123/81 and her pulse is 66. Her oxygen saturation is 98%.     Chief Complaint: Urinary Tract Infection    Urinary Tract Infection    This is a new problem. The current episode started in the past 7 days. The problem has been gradually worsening. The quality of the pain is described as aching and burning. Associated symptoms include a discharge, frequency, urgency and bubble bath use. Pertinent negatives include no nausea or vomiting. She has tried nothing for the symptoms.       Constitution: Negative for fever.   Gastrointestinal: Negative for abdominal pain, nausea, vomiting and diarrhea.   Genitourinary: Positive for frequency, urgency and vaginal discharge (thin, clear + odor ).       Objective:      Physical Exam   Constitutional: She is oriented to person, place, and time. She appears well-developed and well-nourished.   Eyes: Conjunctivae are normal.   Neck: Normal range of motion.   Cardiovascular: Normal rate.   Pulmonary/Chest: Effort normal.   Musculoskeletal:   No CVA TTP    Neurological: She is alert and oriented to person, place, and time.   Psychiatric: She has a normal mood and affect. Her behavior is normal.   Nursing note and vitals reviewed.        Assessment:       1. Dysuria    2. Vaginal discharge        Plan:         Dysuria  -     POCT Urinalysis, Dipstick, Automated, W/O Scope    Vaginal discharge    Other orders  -     metroNIDAZOLE (FLAGYL) 500 MG tablet; Take 1 tablet (500 mg total) by mouth every 12 (twelve) hours. for 7 days  Dispense: 14 tablet; Refill: 0    Pt presents with one week dysuria, also states increased clear malodorous discharge. Denies STD exposure and as UA without UTI to treat for concern of BV and to FU with PCP.        "

## 2021-01-09 ENCOUNTER — HOSPITAL ENCOUNTER (OUTPATIENT)
Facility: HOSPITAL | Age: 56
Discharge: HOME | End: 2021-01-09
Attending: EMERGENCY MEDICINE | Admitting: NURSE PRACTITIONER
Payer: MEDICAID

## 2021-01-09 ENCOUNTER — ANESTHESIA (OUTPATIENT)
Dept: SURGERY | Facility: HOSPITAL | Age: 56
End: 2021-01-09
Payer: MEDICAID

## 2021-01-09 ENCOUNTER — ANESTHESIA EVENT (OUTPATIENT)
Dept: SURGERY | Facility: HOSPITAL | Age: 56
End: 2021-01-09
Payer: MEDICAID

## 2021-01-09 VITALS
WEIGHT: 117.94 LBS | BODY MASS INDEX: 21.7 KG/M2 | SYSTOLIC BLOOD PRESSURE: 123 MMHG | TEMPERATURE: 98 F | HEART RATE: 70 BPM | HEIGHT: 62 IN | DIASTOLIC BLOOD PRESSURE: 72 MMHG | OXYGEN SATURATION: 97 % | RESPIRATION RATE: 12 BRPM

## 2021-01-09 DIAGNOSIS — R52 INTRACTABLE PAIN: ICD-10-CM

## 2021-01-09 DIAGNOSIS — N20.0 RENAL CALCULUS, RIGHT: ICD-10-CM

## 2021-01-09 DIAGNOSIS — N20.2 NEPHROURETEROLITHIASIS: Primary | ICD-10-CM

## 2021-01-09 PROBLEM — E03.9 HYPOTHYROIDISM: Status: ACTIVE | Noted: 2021-01-09

## 2021-01-09 LAB
ALBUMIN SERPL BCP-MCNC: 4.3 G/DL (ref 3.5–5.2)
ALP SERPL-CCNC: 84 U/L (ref 55–135)
ALT SERPL W/O P-5'-P-CCNC: 22 U/L (ref 10–44)
ANION GAP SERPL CALC-SCNC: 12 MMOL/L (ref 8–16)
AST SERPL-CCNC: 26 U/L (ref 10–40)
B-HCG UR QL: NEGATIVE
BACTERIA #/AREA URNS HPF: ABNORMAL /HPF
BACTERIA #/AREA URNS HPF: NORMAL /HPF
BASOPHILS # BLD AUTO: 0.07 K/UL (ref 0–0.2)
BASOPHILS NFR BLD: 0.9 % (ref 0–1.9)
BILIRUB SERPL-MCNC: 0.1 MG/DL (ref 0.1–1)
BILIRUB UR QL STRIP: NEGATIVE
BILIRUB UR QL STRIP: NEGATIVE
BUN SERPL-MCNC: 21 MG/DL (ref 6–20)
CALCIUM SERPL-MCNC: 9.4 MG/DL (ref 8.7–10.5)
CHLORIDE SERPL-SCNC: 102 MMOL/L (ref 95–110)
CLARITY UR: ABNORMAL
CLARITY UR: CLEAR
CO2 SERPL-SCNC: 24 MMOL/L (ref 23–29)
COLOR UR: ABNORMAL
COLOR UR: YELLOW
CREAT SERPL-MCNC: 0.8 MG/DL (ref 0.5–1.4)
CTP QC/QA: YES
DIFFERENTIAL METHOD: NORMAL
EOSINOPHIL # BLD AUTO: 0.2 K/UL (ref 0–0.5)
EOSINOPHIL NFR BLD: 2.6 % (ref 0–8)
ERYTHROCYTE [DISTWIDTH] IN BLOOD BY AUTOMATED COUNT: 13.7 % (ref 11.5–14.5)
EST. GFR  (AFRICAN AMERICAN): >60 ML/MIN/1.73 M^2
EST. GFR  (NON AFRICAN AMERICAN): >60 ML/MIN/1.73 M^2
GLUCOSE SERPL-MCNC: 116 MG/DL (ref 70–110)
GLUCOSE UR QL STRIP: NEGATIVE
GLUCOSE UR QL STRIP: NEGATIVE
HCT VFR BLD AUTO: 42.3 % (ref 37–48.5)
HGB BLD-MCNC: 13.7 G/DL (ref 12–16)
HGB UR QL STRIP: ABNORMAL
HGB UR QL STRIP: ABNORMAL
HYALINE CASTS #/AREA URNS LPF: 0 /LPF
IMM GRANULOCYTES # BLD AUTO: 0.03 K/UL (ref 0–0.04)
IMM GRANULOCYTES NFR BLD AUTO: 0.4 % (ref 0–0.5)
KETONES UR QL STRIP: NEGATIVE
KETONES UR QL STRIP: NEGATIVE
LEUKOCYTE ESTERASE UR QL STRIP: NEGATIVE
LEUKOCYTE ESTERASE UR QL STRIP: NEGATIVE
LYMPHOCYTES # BLD AUTO: 2.9 K/UL (ref 1–4.8)
LYMPHOCYTES NFR BLD: 34.7 % (ref 18–48)
MCH RBC QN AUTO: 29 PG (ref 27–31)
MCHC RBC AUTO-ENTMCNC: 32.4 G/DL (ref 32–36)
MCV RBC AUTO: 90 FL (ref 82–98)
MICROSCOPIC COMMENT: ABNORMAL
MICROSCOPIC COMMENT: NORMAL
MONOCYTES # BLD AUTO: 0.7 K/UL (ref 0.3–1)
MONOCYTES NFR BLD: 8 % (ref 4–15)
NEUTROPHILS # BLD AUTO: 4.4 K/UL (ref 1.8–7.7)
NEUTROPHILS NFR BLD: 53.4 % (ref 38–73)
NITRITE UR QL STRIP: NEGATIVE
NITRITE UR QL STRIP: NEGATIVE
NRBC BLD-RTO: 0 /100 WBC
PH UR STRIP: 5 [PH] (ref 5–8)
PH UR STRIP: 7 [PH] (ref 5–8)
PLATELET # BLD AUTO: 227 K/UL (ref 150–350)
PMV BLD AUTO: 10.4 FL (ref 9.2–12.9)
POTASSIUM SERPL-SCNC: 4.6 MMOL/L (ref 3.5–5.1)
PROT SERPL-MCNC: 7.6 G/DL (ref 6–8.4)
PROT UR QL STRIP: ABNORMAL
PROT UR QL STRIP: NEGATIVE
RBC # BLD AUTO: 4.72 M/UL (ref 4–5.4)
RBC #/AREA URNS HPF: 3 /HPF (ref 0–4)
RBC #/AREA URNS HPF: >100 /HPF (ref 0–4)
SARS-COV-2 RDRP RESP QL NAA+PROBE: NEGATIVE
SODIUM SERPL-SCNC: 138 MMOL/L (ref 136–145)
SP GR UR STRIP: 1.02 (ref 1–1.03)
SP GR UR STRIP: <=1.005 (ref 1–1.03)
URN SPEC COLLECT METH UR: ABNORMAL
URN SPEC COLLECT METH UR: ABNORMAL
UROBILINOGEN UR STRIP-ACNC: NEGATIVE EU/DL
UROBILINOGEN UR STRIP-ACNC: NEGATIVE EU/DL
WBC # BLD AUTO: 8.21 K/UL (ref 3.9–12.7)
WBC #/AREA URNS HPF: 0 /HPF (ref 0–5)

## 2021-01-09 PROCEDURE — 25000003 PHARM REV CODE 250: Performed by: ANESTHESIOLOGY

## 2021-01-09 PROCEDURE — 36000706: Performed by: UROLOGY

## 2021-01-09 PROCEDURE — 87077 CULTURE AEROBIC IDENTIFY: CPT

## 2021-01-09 PROCEDURE — G0378 HOSPITAL OBSERVATION PER HR: HCPCS

## 2021-01-09 PROCEDURE — 81025 URINE PREGNANCY TEST: CPT | Performed by: EMERGENCY MEDICINE

## 2021-01-09 PROCEDURE — 71000039 HC RECOVERY, EACH ADD'L HOUR: Performed by: UROLOGY

## 2021-01-09 PROCEDURE — 36000707: Performed by: UROLOGY

## 2021-01-09 PROCEDURE — 99225 PR SUBSEQUENT OBSERVATION CARE,LEVEL II: ICD-10-PCS | Mod: 25,,, | Performed by: UROLOGY

## 2021-01-09 PROCEDURE — D9220A PRA ANESTHESIA: Mod: ANES,,, | Performed by: ANESTHESIOLOGY

## 2021-01-09 PROCEDURE — 63600175 PHARM REV CODE 636 W HCPCS: Performed by: NURSE ANESTHETIST, CERTIFIED REGISTERED

## 2021-01-09 PROCEDURE — 00910 ANES TRANSURETHRAL PX NOS: CPT | Performed by: UROLOGY

## 2021-01-09 PROCEDURE — 87086 URINE CULTURE/COLONY COUNT: CPT

## 2021-01-09 PROCEDURE — 27200651 HC AIRWAY, LMA: Performed by: ANESTHESIOLOGY

## 2021-01-09 PROCEDURE — 87186 SC STD MICRODIL/AGAR DIL: CPT | Mod: 59

## 2021-01-09 PROCEDURE — 81000 URINALYSIS NONAUTO W/SCOPE: CPT

## 2021-01-09 PROCEDURE — 71000033 HC RECOVERY, INTIAL HOUR: Performed by: UROLOGY

## 2021-01-09 PROCEDURE — 37000008 HC ANESTHESIA 1ST 15 MINUTES: Performed by: UROLOGY

## 2021-01-09 PROCEDURE — 74420 UROGRAPHY RTRGR +-KUB: CPT | Mod: 26,,, | Performed by: UROLOGY

## 2021-01-09 PROCEDURE — D9220A PRA ANESTHESIA: ICD-10-PCS | Mod: ANES,,, | Performed by: ANESTHESIOLOGY

## 2021-01-09 PROCEDURE — U0002 COVID-19 LAB TEST NON-CDC: HCPCS

## 2021-01-09 PROCEDURE — C2617 STENT, NON-COR, TEM W/O DEL: HCPCS | Performed by: UROLOGY

## 2021-01-09 PROCEDURE — 96361 HYDRATE IV INFUSION ADD-ON: CPT

## 2021-01-09 PROCEDURE — 96375 TX/PRO/DX INJ NEW DRUG ADDON: CPT

## 2021-01-09 PROCEDURE — 94760 N-INVAS EAR/PLS OXIMETRY 1: CPT | Mod: 59

## 2021-01-09 PROCEDURE — 52332 PR CYSTOSCOPY,INSERT URETERAL STENT: ICD-10-PCS | Mod: 22,RT,, | Performed by: UROLOGY

## 2021-01-09 PROCEDURE — 63600175 PHARM REV CODE 636 W HCPCS: Performed by: EMERGENCY MEDICINE

## 2021-01-09 PROCEDURE — 25000003 PHARM REV CODE 250: Performed by: NURSE ANESTHETIST, CERTIFIED REGISTERED

## 2021-01-09 PROCEDURE — 74420 PR  X-RAY RETROGRADE PYELOGRAM: ICD-10-PCS | Mod: 26,,, | Performed by: UROLOGY

## 2021-01-09 PROCEDURE — 85025 COMPLETE CBC W/AUTO DIFF WBC: CPT

## 2021-01-09 PROCEDURE — 99225 PR SUBSEQUENT OBSERVATION CARE,LEVEL II: CPT | Mod: 25,,, | Performed by: UROLOGY

## 2021-01-09 PROCEDURE — 99285 EMERGENCY DEPT VISIT HI MDM: CPT | Mod: 25

## 2021-01-09 PROCEDURE — C1758 CATHETER, URETERAL: HCPCS | Performed by: UROLOGY

## 2021-01-09 PROCEDURE — D9220A PRA ANESTHESIA: Mod: CRNA,,, | Performed by: NURSE ANESTHETIST, CERTIFIED REGISTERED

## 2021-01-09 PROCEDURE — D9220A PRA ANESTHESIA: ICD-10-PCS | Mod: CRNA,,, | Performed by: NURSE ANESTHETIST, CERTIFIED REGISTERED

## 2021-01-09 PROCEDURE — 52332 CYSTOSCOPY AND TREATMENT: CPT | Mod: 22,RT,, | Performed by: UROLOGY

## 2021-01-09 PROCEDURE — 80053 COMPREHEN METABOLIC PANEL: CPT

## 2021-01-09 PROCEDURE — 96374 THER/PROPH/DIAG INJ IV PUSH: CPT | Mod: 59

## 2021-01-09 PROCEDURE — 37000009 HC ANESTHESIA EA ADD 15 MINS: Performed by: UROLOGY

## 2021-01-09 PROCEDURE — 25000003 PHARM REV CODE 250: Performed by: EMERGENCY MEDICINE

## 2021-01-09 PROCEDURE — 25500020 PHARM REV CODE 255: Performed by: UROLOGY

## 2021-01-09 PROCEDURE — 87086 URINE CULTURE/COLONY COUNT: CPT | Mod: 59

## 2021-01-09 PROCEDURE — 81000 URINALYSIS NONAUTO W/SCOPE: CPT | Mod: 91

## 2021-01-09 PROCEDURE — C1769 GUIDE WIRE: HCPCS | Performed by: UROLOGY

## 2021-01-09 PROCEDURE — 87088 URINE BACTERIA CULTURE: CPT

## 2021-01-09 DEVICE — STENT URETERAL UNIV 7FR 24CM: Type: IMPLANTABLE DEVICE | Site: URETER | Status: FUNCTIONAL

## 2021-01-09 RX ORDER — SODIUM CHLORIDE 9 MG/ML
INJECTION, SOLUTION INTRAVENOUS CONTINUOUS
Status: DISCONTINUED | OUTPATIENT
Start: 2021-01-09 | End: 2021-01-09 | Stop reason: HOSPADM

## 2021-01-09 RX ORDER — TAMSULOSIN HYDROCHLORIDE 0.4 MG/1
0.4 CAPSULE ORAL
Qty: 30 CAPSULE | Refills: 0 | Status: SHIPPED | OUTPATIENT
Start: 2021-01-09 | End: 2022-01-05

## 2021-01-09 RX ORDER — ONDANSETRON 2 MG/ML
4 INJECTION INTRAMUSCULAR; INTRAVENOUS
Status: COMPLETED | OUTPATIENT
Start: 2021-01-09 | End: 2021-01-09

## 2021-01-09 RX ORDER — OXYCODONE HYDROCHLORIDE 5 MG/1
5 TABLET ORAL
Status: DISCONTINUED | OUTPATIENT
Start: 2021-01-09 | End: 2021-01-09 | Stop reason: HOSPADM

## 2021-01-09 RX ORDER — TALC
6 POWDER (GRAM) TOPICAL NIGHTLY PRN
Status: DISCONTINUED | OUTPATIENT
Start: 2021-01-09 | End: 2021-01-09 | Stop reason: HOSPADM

## 2021-01-09 RX ORDER — HYDROMORPHONE HYDROCHLORIDE 2 MG/ML
0.2 INJECTION, SOLUTION INTRAMUSCULAR; INTRAVENOUS; SUBCUTANEOUS EVERY 5 MIN PRN
Status: DISCONTINUED | OUTPATIENT
Start: 2021-01-09 | End: 2021-01-09 | Stop reason: HOSPADM

## 2021-01-09 RX ORDER — LANOLIN ALCOHOL/MO/W.PET/CERES
800 CREAM (GRAM) TOPICAL
Status: DISCONTINUED | OUTPATIENT
Start: 2021-01-09 | End: 2021-01-09 | Stop reason: HOSPADM

## 2021-01-09 RX ORDER — HYDROMORPHONE HYDROCHLORIDE 2 MG/ML
1 INJECTION, SOLUTION INTRAMUSCULAR; INTRAVENOUS; SUBCUTANEOUS EVERY 4 HOURS PRN
Status: DISCONTINUED | OUTPATIENT
Start: 2021-01-09 | End: 2021-01-09 | Stop reason: HOSPADM

## 2021-01-09 RX ORDER — LIDOCAINE HCL/PF 100 MG/5ML
SYRINGE (ML) INTRAVENOUS
Status: DISCONTINUED | OUTPATIENT
Start: 2021-01-09 | End: 2021-01-09

## 2021-01-09 RX ORDER — HYDROCODONE BITARTRATE AND ACETAMINOPHEN 5; 325 MG/1; MG/1
1 TABLET ORAL EVERY 6 HOURS PRN
Qty: 15 TABLET | Refills: 0 | Status: SHIPPED | OUTPATIENT
Start: 2021-01-09 | End: 2021-01-19

## 2021-01-09 RX ORDER — HYDROMORPHONE HYDROCHLORIDE 2 MG/ML
1 INJECTION, SOLUTION INTRAMUSCULAR; INTRAVENOUS; SUBCUTANEOUS
Status: COMPLETED | OUTPATIENT
Start: 2021-01-09 | End: 2021-01-09

## 2021-01-09 RX ORDER — CIPROFLOXACIN 2 MG/ML
400 INJECTION, SOLUTION INTRAVENOUS
Status: DISCONTINUED | OUTPATIENT
Start: 2021-01-09 | End: 2021-01-09 | Stop reason: HOSPADM

## 2021-01-09 RX ORDER — KETOROLAC TROMETHAMINE 30 MG/ML
15 INJECTION, SOLUTION INTRAMUSCULAR; INTRAVENOUS
Status: COMPLETED | OUTPATIENT
Start: 2021-01-09 | End: 2021-01-09

## 2021-01-09 RX ORDER — MORPHINE SULFATE 2 MG/ML
6 INJECTION, SOLUTION INTRAMUSCULAR; INTRAVENOUS
Status: DISCONTINUED | OUTPATIENT
Start: 2021-01-09 | End: 2021-01-09

## 2021-01-09 RX ORDER — AMOXICILLIN AND CLAVULANATE POTASSIUM 875; 125 MG/1; MG/1
1 TABLET, FILM COATED ORAL 2 TIMES DAILY
Qty: 6 TABLET | Refills: 0 | Status: SHIPPED | OUTPATIENT
Start: 2021-01-09 | End: 2021-01-12

## 2021-01-09 RX ORDER — FENTANYL CITRATE 50 UG/ML
INJECTION, SOLUTION INTRAMUSCULAR; INTRAVENOUS
Status: DISCONTINUED | OUTPATIENT
Start: 2021-01-09 | End: 2021-01-09

## 2021-01-09 RX ORDER — SODIUM CHLORIDE 0.9 % (FLUSH) 0.9 %
10 SYRINGE (ML) INJECTION
Status: DISCONTINUED | OUTPATIENT
Start: 2021-01-09 | End: 2021-01-09 | Stop reason: HOSPADM

## 2021-01-09 RX ORDER — ONDANSETRON 2 MG/ML
8 INJECTION INTRAMUSCULAR; INTRAVENOUS EVERY 8 HOURS PRN
Status: DISCONTINUED | OUTPATIENT
Start: 2021-01-09 | End: 2021-01-09 | Stop reason: HOSPADM

## 2021-01-09 RX ORDER — PHENYLEPHRINE HYDROCHLORIDE 10 MG/ML
INJECTION INTRAVENOUS
Status: DISCONTINUED | OUTPATIENT
Start: 2021-01-09 | End: 2021-01-09

## 2021-01-09 RX ORDER — CIPROFLOXACIN 2 MG/ML
INJECTION, SOLUTION INTRAVENOUS
Status: DISCONTINUED | OUTPATIENT
Start: 2021-01-09 | End: 2021-01-09

## 2021-01-09 RX ORDER — EPHEDRINE SULFATE 50 MG/ML
INJECTION, SOLUTION INTRAVENOUS
Status: DISCONTINUED | OUTPATIENT
Start: 2021-01-09 | End: 2021-01-09

## 2021-01-09 RX ORDER — SODIUM,POTASSIUM PHOSPHATES 280-250MG
2 POWDER IN PACKET (EA) ORAL
Status: DISCONTINUED | OUTPATIENT
Start: 2021-01-09 | End: 2021-01-09 | Stop reason: HOSPADM

## 2021-01-09 RX ORDER — PROPOFOL 10 MG/ML
VIAL (ML) INTRAVENOUS
Status: DISCONTINUED | OUTPATIENT
Start: 2021-01-09 | End: 2021-01-09

## 2021-01-09 RX ORDER — KETOROLAC TROMETHAMINE 10 MG/1
10 TABLET, FILM COATED ORAL EVERY 8 HOURS PRN
Qty: 10 TABLET | Refills: 0 | Status: SHIPPED | OUTPATIENT
Start: 2021-01-09 | End: 2021-01-14

## 2021-01-09 RX ORDER — KETOROLAC TROMETHAMINE 30 MG/ML
INJECTION, SOLUTION INTRAMUSCULAR; INTRAVENOUS
Status: DISCONTINUED | OUTPATIENT
Start: 2021-01-09 | End: 2021-01-09

## 2021-01-09 RX ORDER — IBUPROFEN 200 MG
16 TABLET ORAL
Status: DISCONTINUED | OUTPATIENT
Start: 2021-01-09 | End: 2021-01-09 | Stop reason: HOSPADM

## 2021-01-09 RX ORDER — ACETAMINOPHEN 325 MG/1
650 TABLET ORAL EVERY 8 HOURS PRN
Status: DISCONTINUED | OUTPATIENT
Start: 2021-01-09 | End: 2021-01-09 | Stop reason: HOSPADM

## 2021-01-09 RX ORDER — DEXAMETHASONE SODIUM PHOSPHATE 4 MG/ML
INJECTION, SOLUTION INTRA-ARTICULAR; INTRALESIONAL; INTRAMUSCULAR; INTRAVENOUS; SOFT TISSUE
Status: DISCONTINUED | OUTPATIENT
Start: 2021-01-09 | End: 2021-01-09

## 2021-01-09 RX ORDER — FENTANYL CITRATE 50 UG/ML
25 INJECTION, SOLUTION INTRAMUSCULAR; INTRAVENOUS EVERY 5 MIN PRN
Status: DISCONTINUED | OUTPATIENT
Start: 2021-01-09 | End: 2021-01-09 | Stop reason: HOSPADM

## 2021-01-09 RX ORDER — ACETAMINOPHEN 10 MG/ML
INJECTION, SOLUTION INTRAVENOUS
Status: DISCONTINUED | OUTPATIENT
Start: 2021-01-09 | End: 2021-01-09

## 2021-01-09 RX ORDER — MORPHINE SULFATE 4 MG/ML
4 INJECTION, SOLUTION INTRAMUSCULAR; INTRAVENOUS
Status: COMPLETED | OUTPATIENT
Start: 2021-01-09 | End: 2021-01-09

## 2021-01-09 RX ORDER — IBUPROFEN 200 MG
24 TABLET ORAL
Status: DISCONTINUED | OUTPATIENT
Start: 2021-01-09 | End: 2021-01-09 | Stop reason: HOSPADM

## 2021-01-09 RX ORDER — ONDANSETRON 2 MG/ML
INJECTION INTRAMUSCULAR; INTRAVENOUS
Status: DISCONTINUED | OUTPATIENT
Start: 2021-01-09 | End: 2021-01-09

## 2021-01-09 RX ORDER — GLUCAGON 1 MG
1 KIT INJECTION
Status: DISCONTINUED | OUTPATIENT
Start: 2021-01-09 | End: 2021-01-09 | Stop reason: HOSPADM

## 2021-01-09 RX ORDER — MIDAZOLAM HYDROCHLORIDE 1 MG/ML
INJECTION INTRAMUSCULAR; INTRAVENOUS
Status: DISCONTINUED | OUTPATIENT
Start: 2021-01-09 | End: 2021-01-09

## 2021-01-09 RX ORDER — KETOROLAC TROMETHAMINE 30 MG/ML
15 INJECTION, SOLUTION INTRAMUSCULAR; INTRAVENOUS EVERY 6 HOURS PRN
Status: DISCONTINUED | OUTPATIENT
Start: 2021-01-09 | End: 2021-01-09 | Stop reason: HOSPADM

## 2021-01-09 RX ADMIN — MIDAZOLAM HYDROCHLORIDE 2 MG: 1 INJECTION, SOLUTION INTRAMUSCULAR; INTRAVENOUS at 10:01

## 2021-01-09 RX ADMIN — SODIUM CHLORIDE 1000 ML: 0.9 INJECTION, SOLUTION INTRAVENOUS at 04:01

## 2021-01-09 RX ADMIN — FENTANYL CITRATE 25 MCG: 0.05 INJECTION, SOLUTION INTRAMUSCULAR; INTRAVENOUS at 11:01

## 2021-01-09 RX ADMIN — CIPROFLOXACIN 400 MG: 2 INJECTION INTRAVENOUS at 11:01

## 2021-01-09 RX ADMIN — ACETAMINOPHEN 500 MG: 10 INJECTION, SOLUTION INTRAVENOUS at 11:01

## 2021-01-09 RX ADMIN — OXYCODONE 5 MG: 5 TABLET ORAL at 12:01

## 2021-01-09 RX ADMIN — EPHEDRINE SULFATE 10 MG: 50 INJECTION, SOLUTION INTRAMUSCULAR; INTRAVENOUS; SUBCUTANEOUS at 11:01

## 2021-01-09 RX ADMIN — ONDANSETRON 4 MG: 2 INJECTION, SOLUTION INTRAMUSCULAR; INTRAVENOUS at 11:01

## 2021-01-09 RX ADMIN — GLYCOPYRROLATE 0.2 MG: 0.2 INJECTION, SOLUTION INTRAMUSCULAR; INTRAVITREAL at 10:01

## 2021-01-09 RX ADMIN — FENTANYL CITRATE 25 MCG: 0.05 INJECTION, SOLUTION INTRAMUSCULAR; INTRAVENOUS at 10:01

## 2021-01-09 RX ADMIN — PROPOFOL 160 MG: 10 INJECTION, EMULSION INTRAVENOUS at 10:01

## 2021-01-09 RX ADMIN — ONDANSETRON 4 MG: 2 INJECTION INTRAMUSCULAR; INTRAVENOUS at 04:01

## 2021-01-09 RX ADMIN — PHENYLEPHRINE HYDROCHLORIDE 100 MCG: 10 INJECTION INTRAVENOUS at 11:01

## 2021-01-09 RX ADMIN — KETOROLAC TROMETHAMINE 15 MG: 30 INJECTION, SOLUTION INTRAMUSCULAR; INTRAVENOUS at 04:01

## 2021-01-09 RX ADMIN — MORPHINE SULFATE 4 MG: 4 INJECTION INTRAVENOUS at 05:01

## 2021-01-09 RX ADMIN — DEXAMETHASONE SODIUM PHOSPHATE 4 MG: 4 INJECTION, SOLUTION INTRA-ARTICULAR; INTRALESIONAL; INTRAMUSCULAR; INTRAVENOUS; SOFT TISSUE at 11:01

## 2021-01-09 RX ADMIN — LIDOCAINE HYDROCHLORIDE 75 MG: 20 INJECTION, SOLUTION INTRAVENOUS at 10:01

## 2021-01-09 RX ADMIN — FENTANYL CITRATE 50 MCG: 0.05 INJECTION, SOLUTION INTRAMUSCULAR; INTRAVENOUS at 11:01

## 2021-01-09 RX ADMIN — KETOROLAC TROMETHAMINE 30 MG: 30 INJECTION, SOLUTION INTRAMUSCULAR; INTRAVENOUS at 11:01

## 2021-01-09 RX ADMIN — HYDROMORPHONE HYDROCHLORIDE 1 MG: 2 INJECTION INTRAMUSCULAR; INTRAVENOUS; SUBCUTANEOUS at 06:01

## 2021-01-11 ENCOUNTER — TELEPHONE (OUTPATIENT)
Dept: MEDSURG UNIT | Facility: HOSPITAL | Age: 56
End: 2021-01-11

## 2021-01-11 ENCOUNTER — TELEPHONE (OUTPATIENT)
Dept: UROLOGY | Facility: CLINIC | Age: 56
End: 2021-01-11

## 2021-01-11 LAB — BACTERIA UR CULT: NO GROWTH

## 2021-01-12 LAB — BACTERIA UR CULT: ABNORMAL

## 2021-01-13 ENCOUNTER — HOSPITAL ENCOUNTER (OUTPATIENT)
Dept: RADIOLOGY | Facility: HOSPITAL | Age: 56
Discharge: HOME | End: 2021-01-13
Attending: UROLOGY
Payer: MEDICAID

## 2021-01-13 DIAGNOSIS — N20.0 RENAL CALCULUS, RIGHT: ICD-10-CM

## 2021-01-13 DIAGNOSIS — Z01.818 PREOP EXAMINATION: ICD-10-CM

## 2021-01-13 PROCEDURE — 74176 CT ABD & PELVIS W/O CONTRAST: CPT | Mod: 26,,, | Performed by: RADIOLOGY

## 2021-01-13 PROCEDURE — 74176 CT ABD & PELVIS W/O CONTRAST: CPT | Mod: TC

## 2021-01-13 PROCEDURE — 74176 CT RENAL STONE STUDY ABD PELVIS WO: ICD-10-PCS | Mod: 26,,, | Performed by: RADIOLOGY

## 2021-01-13 RX ORDER — LIDOCAINE HYDROCHLORIDE 20 MG/ML
JELLY TOPICAL ONCE
Status: CANCELLED | OUTPATIENT
Start: 2021-01-13 | End: 2021-01-13

## 2021-01-25 ENCOUNTER — APPOINTMENT (OUTPATIENT)
Dept: LAB | Facility: HOSPITAL | Age: 56
End: 2021-01-25
Attending: UROLOGY
Payer: MEDICAID

## 2021-01-25 ENCOUNTER — CLINICAL SUPPORT (OUTPATIENT)
Dept: UROLOGY | Facility: CLINIC | Age: 56
End: 2021-01-25
Payer: MEDICAID

## 2021-01-25 DIAGNOSIS — R82.998 CELLS AND CASTS IN URINE: Primary | ICD-10-CM

## 2021-01-25 LAB
BACTERIA #/AREA URNS HPF: ABNORMAL /HPF
BILIRUB UR QL STRIP: NEGATIVE
CLARITY UR: ABNORMAL
COLOR UR: YELLOW
GLUCOSE UR QL STRIP: NEGATIVE
HGB UR QL STRIP: ABNORMAL
HYALINE CASTS #/AREA URNS LPF: 0 /LPF
KETONES UR QL STRIP: NEGATIVE
LEUKOCYTE ESTERASE UR QL STRIP: ABNORMAL
MICROSCOPIC COMMENT: ABNORMAL
NITRITE UR QL STRIP: NEGATIVE
PH UR STRIP: 6 [PH] (ref 5–8)
PROT UR QL STRIP: ABNORMAL
RBC #/AREA URNS HPF: >100 /HPF (ref 0–4)
SP GR UR STRIP: >=1.03 (ref 1–1.03)
SQUAMOUS #/AREA URNS HPF: 7 /HPF
URN SPEC COLLECT METH UR: ABNORMAL
UROBILINOGEN UR STRIP-ACNC: NEGATIVE EU/DL
WBC #/AREA URNS HPF: 20 /HPF (ref 0–5)

## 2021-01-25 PROCEDURE — 99499 UNLISTED E&M SERVICE: CPT | Mod: S$PBB,,, | Performed by: UROLOGY

## 2021-01-25 PROCEDURE — 99499 NO LOS: ICD-10-PCS | Mod: S$PBB,,, | Performed by: UROLOGY

## 2021-01-25 PROCEDURE — 81000 URINALYSIS NONAUTO W/SCOPE: CPT

## 2021-01-25 PROCEDURE — 87086 URINE CULTURE/COLONY COUNT: CPT

## 2021-01-26 RX ORDER — GENTAMICIN SULFATE 80 MG/100ML
80 INJECTION, SOLUTION INTRAVENOUS
Status: DISCONTINUED | OUTPATIENT
Start: 2021-01-26 | End: 2021-01-26

## 2021-01-27 ENCOUNTER — TELEPHONE (OUTPATIENT)
Dept: UROLOGY | Facility: CLINIC | Age: 56
End: 2021-01-27

## 2021-01-27 LAB — BACTERIA UR CULT: NO GROWTH

## 2021-01-29 ENCOUNTER — LAB VISIT (OUTPATIENT)
Dept: PRIMARY CARE CLINIC | Facility: CLINIC | Age: 56
End: 2021-01-29
Payer: MEDICAID

## 2021-01-29 DIAGNOSIS — Z01.818 PREOP EXAMINATION: ICD-10-CM

## 2021-01-29 PROCEDURE — U0003 INFECTIOUS AGENT DETECTION BY NUCLEIC ACID (DNA OR RNA); SEVERE ACUTE RESPIRATORY SYNDROME CORONAVIRUS 2 (SARS-COV-2) (CORONAVIRUS DISEASE [COVID-19]), AMPLIFIED PROBE TECHNIQUE, MAKING USE OF HIGH THROUGHPUT TECHNOLOGIES AS DESCRIBED BY CMS-2020-01-R: HCPCS

## 2021-01-30 LAB — SARS-COV-2 RNA RESP QL NAA+PROBE: NOT DETECTED

## 2021-02-01 ENCOUNTER — TELEPHONE (OUTPATIENT)
Dept: UROLOGY | Facility: CLINIC | Age: 56
End: 2021-02-01

## 2021-02-01 ENCOUNTER — HOSPITAL ENCOUNTER (OUTPATIENT)
Facility: AMBULARY SURGERY CENTER | Age: 56
Discharge: HOME | End: 2021-02-01
Attending: UROLOGY | Admitting: UROLOGY
Payer: MEDICAID

## 2021-02-01 DIAGNOSIS — N20.2 NEPHROURETEROLITHIASIS: Primary | ICD-10-CM

## 2021-02-01 DIAGNOSIS — Z01.818 PREOP EXAMINATION: ICD-10-CM

## 2021-02-01 DIAGNOSIS — N20.0 RENAL CALCULUS: ICD-10-CM

## 2021-02-01 LAB
BACTERIA SPEC CULT: NORMAL
BILIRUB SERPL-MCNC: NORMAL MG/DL
BLOOD URINE, POC: 250
CASTS: NORMAL
COLOR, POC UA: NORMAL
CRYSTALS: NORMAL
GLUCOSE UR QL STRIP: NORMAL
KETONES UR QL STRIP: NORMAL
LEUKOCYTE ESTERASE URINE, POC: NORMAL
NITRITE, POC UA: NORMAL
PH, POC UA: 7
PROTEIN, POC: NORMAL
RBC CELLS COUNTED: NORMAL
SPECIFIC GRAVITY, POC UA: 1.01
UROBILINOGEN, POC UA: NORMAL
WHITE BLOOD CELLS: NORMAL

## 2021-02-01 PROCEDURE — 52310 CYSTOSCOPY AND TREATMENT: CPT | Performed by: UROLOGY

## 2021-02-01 PROCEDURE — 52310 PR CYSTOSCOPY,REMV CALCULUS,SIMPLE: ICD-10-PCS | Mod: ,,, | Performed by: UROLOGY

## 2021-02-01 PROCEDURE — 52310 CYSTOSCOPY AND TREATMENT: CPT | Mod: ,,, | Performed by: UROLOGY

## 2021-02-01 RX ORDER — LIDOCAINE HYDROCHLORIDE 20 MG/ML
JELLY TOPICAL
Status: DISCONTINUED
Start: 2021-02-01 | End: 2021-02-01 | Stop reason: HOSPADM

## 2021-02-01 RX ORDER — LIDOCAINE HYDROCHLORIDE 20 MG/ML
JELLY TOPICAL
Status: DISCONTINUED | OUTPATIENT
Start: 2021-02-01 | End: 2021-02-01 | Stop reason: HOSPADM

## 2021-02-01 RX ORDER — GENTAMICIN SULFATE 40 MG/ML
80 INJECTION, SOLUTION INTRAMUSCULAR; INTRAVENOUS ONCE
Status: COMPLETED | OUTPATIENT
Start: 2021-02-01 | End: 2021-02-01

## 2021-02-01 RX ORDER — AMOXICILLIN AND CLAVULANATE POTASSIUM 875; 125 MG/1; MG/1
1 TABLET, FILM COATED ORAL 2 TIMES DAILY
Qty: 6 TABLET | Refills: 0 | Status: SHIPPED | OUTPATIENT
Start: 2021-02-01 | End: 2021-02-04

## 2021-02-01 RX ORDER — WATER 1 ML/ML
IRRIGANT IRRIGATION
Status: DISCONTINUED | OUTPATIENT
Start: 2021-02-01 | End: 2021-02-01 | Stop reason: HOSPADM

## 2021-02-01 RX ADMIN — GENTAMICIN SULFATE 80 MG: 40 INJECTION, SOLUTION INTRAMUSCULAR; INTRAVENOUS at 01:02

## 2021-02-02 VITALS
HEIGHT: 62 IN | HEART RATE: 70 BPM | SYSTOLIC BLOOD PRESSURE: 150 MMHG | DIASTOLIC BLOOD PRESSURE: 90 MMHG | RESPIRATION RATE: 18 BRPM | WEIGHT: 118 LBS | BODY MASS INDEX: 21.71 KG/M2 | TEMPERATURE: 98 F | OXYGEN SATURATION: 95 %

## 2021-03-11 DIAGNOSIS — G56.03 CARPAL TUNNEL SYNDROME, BILATERAL UPPER LIMBS: Primary | ICD-10-CM

## 2021-04-13 ENCOUNTER — HOSPITAL ENCOUNTER (OUTPATIENT)
Dept: RADIOLOGY | Facility: HOSPITAL | Age: 56
Discharge: HOME | End: 2021-04-13
Attending: UROLOGY
Payer: MEDICAID

## 2021-04-13 DIAGNOSIS — N20.2 NEPHROURETEROLITHIASIS: ICD-10-CM

## 2021-04-13 PROCEDURE — 76770 US EXAM ABDO BACK WALL COMP: CPT | Mod: 26,,, | Performed by: RADIOLOGY

## 2021-04-13 PROCEDURE — 74018 XR ABDOMEN AP 1 VIEW: ICD-10-PCS | Mod: 26,,, | Performed by: RADIOLOGY

## 2021-04-13 PROCEDURE — 74018 RADEX ABDOMEN 1 VIEW: CPT | Mod: 26,,, | Performed by: RADIOLOGY

## 2021-04-13 PROCEDURE — 76770 US EXAM ABDO BACK WALL COMP: CPT | Mod: TC

## 2021-04-13 PROCEDURE — 76770 US RETROPERITONEAL COMPLETE: ICD-10-PCS | Mod: 26,,, | Performed by: RADIOLOGY

## 2021-04-13 PROCEDURE — 74018 RADEX ABDOMEN 1 VIEW: CPT | Mod: TC,FY

## 2021-04-14 ENCOUNTER — TELEPHONE (OUTPATIENT)
Dept: UROLOGY | Facility: CLINIC | Age: 56
End: 2021-04-14

## 2021-04-20 ENCOUNTER — OFFICE VISIT (OUTPATIENT)
Dept: UROLOGY | Facility: CLINIC | Age: 56
End: 2021-04-20
Payer: MEDICAID

## 2021-04-20 VITALS
WEIGHT: 125.75 LBS | HEIGHT: 62 IN | SYSTOLIC BLOOD PRESSURE: 133 MMHG | DIASTOLIC BLOOD PRESSURE: 84 MMHG | HEART RATE: 64 BPM | BODY MASS INDEX: 23.14 KG/M2

## 2021-04-20 DIAGNOSIS — N20.0 NEPHROLITHIASIS: Primary | ICD-10-CM

## 2021-04-20 LAB
BILIRUB SERPL-MCNC: ABNORMAL MG/DL
BLOOD URINE, POC: ABNORMAL
CLARITY, POC UA: ABNORMAL
COLOR, POC UA: YELLOW
GLUCOSE UR QL STRIP: ABNORMAL
KETONES UR QL STRIP: ABNORMAL
LEUKOCYTE ESTERASE URINE, POC: ABNORMAL
NITRITE, POC UA: ABNORMAL
PH, POC UA: 7
PROTEIN, POC: ABNORMAL
SPECIFIC GRAVITY, POC UA: 1.02
UROBILINOGEN, POC UA: 1

## 2021-04-20 PROCEDURE — 99213 OFFICE O/P EST LOW 20 MIN: CPT | Mod: PBBFAC,PN | Performed by: UROLOGY

## 2021-04-20 PROCEDURE — 81002 URINALYSIS NONAUTO W/O SCOPE: CPT | Mod: PBBFAC,PN | Performed by: UROLOGY

## 2021-04-20 PROCEDURE — 99999 PR PBB SHADOW E&M-EST. PATIENT-LVL III: CPT | Mod: PBBFAC,,, | Performed by: UROLOGY

## 2021-04-20 PROCEDURE — 99213 OFFICE O/P EST LOW 20 MIN: CPT | Mod: S$PBB,,, | Performed by: UROLOGY

## 2021-04-20 PROCEDURE — 99213 PR OFFICE/OUTPT VISIT, EST, LEVL III, 20-29 MIN: ICD-10-PCS | Mod: S$PBB,,, | Performed by: UROLOGY

## 2021-04-20 PROCEDURE — 99999 PR PBB SHADOW E&M-EST. PATIENT-LVL III: ICD-10-PCS | Mod: PBBFAC,,, | Performed by: UROLOGY

## 2021-12-17 ENCOUNTER — HOSPITAL ENCOUNTER (EMERGENCY)
Facility: HOSPITAL | Age: 56
Discharge: HOME | End: 2021-12-17
Attending: EMERGENCY MEDICINE
Payer: MEDICAID

## 2021-12-17 VITALS
HEIGHT: 62 IN | BODY MASS INDEX: 22.08 KG/M2 | SYSTOLIC BLOOD PRESSURE: 114 MMHG | WEIGHT: 120 LBS | RESPIRATION RATE: 20 BRPM | HEART RATE: 75 BPM | OXYGEN SATURATION: 98 % | TEMPERATURE: 98 F | DIASTOLIC BLOOD PRESSURE: 62 MMHG

## 2021-12-17 DIAGNOSIS — T78.40XA ALLERGIC REACTION, INITIAL ENCOUNTER: Primary | ICD-10-CM

## 2021-12-17 PROCEDURE — 96372 THER/PROPH/DIAG INJ SC/IM: CPT | Mod: 59

## 2021-12-17 PROCEDURE — 63600175 PHARM REV CODE 636 W HCPCS: Performed by: EMERGENCY MEDICINE

## 2021-12-17 PROCEDURE — 96375 TX/PRO/DX INJ NEW DRUG ADDON: CPT

## 2021-12-17 PROCEDURE — 96374 THER/PROPH/DIAG INJ IV PUSH: CPT

## 2021-12-17 PROCEDURE — 99284 EMERGENCY DEPT VISIT MOD MDM: CPT | Mod: 25

## 2021-12-17 RX ORDER — METHYLPREDNISOLONE SOD SUCC 125 MG
125 VIAL (EA) INJECTION
Status: COMPLETED | OUTPATIENT
Start: 2021-12-17 | End: 2021-12-17

## 2021-12-17 RX ORDER — DEXAMETHASONE 4 MG/1
4 TABLET ORAL ONCE
Qty: 1 TABLET | Refills: 0 | Status: SHIPPED | OUTPATIENT
Start: 2021-12-17 | End: 2021-12-17

## 2021-12-17 RX ORDER — EPINEPHRINE 1 MG/ML
0.3 INJECTION, SOLUTION INTRACARDIAC; INTRAMUSCULAR; INTRAVENOUS; SUBCUTANEOUS
Status: COMPLETED | OUTPATIENT
Start: 2021-12-17 | End: 2021-12-17

## 2021-12-17 RX ORDER — DIPHENHYDRAMINE HYDROCHLORIDE 50 MG/ML
50 INJECTION INTRAMUSCULAR; INTRAVENOUS
Status: COMPLETED | OUTPATIENT
Start: 2021-12-17 | End: 2021-12-17

## 2021-12-17 RX ORDER — EPINEPHRINE 0.3 MG/.3ML
1 INJECTION SUBCUTANEOUS
Qty: 1 EACH | Refills: 0 | Status: SHIPPED | OUTPATIENT
Start: 2021-12-17 | End: 2022-01-05

## 2021-12-17 RX ADMIN — DIPHENHYDRAMINE HYDROCHLORIDE 50 MG: 50 INJECTION, SOLUTION INTRAMUSCULAR; INTRAVENOUS at 01:12

## 2021-12-17 RX ADMIN — METHYLPREDNISOLONE SODIUM SUCCINATE 125 MG: 125 INJECTION, POWDER, FOR SOLUTION INTRAMUSCULAR; INTRAVENOUS at 01:12

## 2021-12-17 RX ADMIN — EPINEPHRINE 0.3 MG: 1 INJECTION, SOLUTION, CONCENTRATE INTRAVENOUS at 01:12

## 2022-01-05 ENCOUNTER — LAB VISIT (OUTPATIENT)
Dept: LAB | Facility: HOSPITAL | Age: 57
End: 2022-01-05
Attending: ALLERGY & IMMUNOLOGY
Payer: MEDICAID

## 2022-01-05 ENCOUNTER — OFFICE VISIT (OUTPATIENT)
Dept: ALLERGY | Facility: CLINIC | Age: 57
End: 2022-01-05
Payer: MEDICAID

## 2022-01-05 VITALS — BODY MASS INDEX: 23.74 KG/M2 | OXYGEN SATURATION: 96 % | HEART RATE: 75 BPM | HEIGHT: 62 IN | WEIGHT: 129 LBS

## 2022-01-05 DIAGNOSIS — T78.2XXA ANAPHYLAXIS, INITIAL ENCOUNTER: Primary | ICD-10-CM

## 2022-01-05 DIAGNOSIS — T78.2XXA ANAPHYLAXIS, INITIAL ENCOUNTER: ICD-10-CM

## 2022-01-05 PROCEDURE — 99204 OFFICE O/P NEW MOD 45 MIN: CPT | Mod: S$PBB,,, | Performed by: ALLERGY & IMMUNOLOGY

## 2022-01-05 PROCEDURE — 86003 ALLG SPEC IGE CRUDE XTRC EA: CPT | Mod: 59 | Performed by: ALLERGY & IMMUNOLOGY

## 2022-01-05 PROCEDURE — 36415 COLL VENOUS BLD VENIPUNCTURE: CPT | Mod: PO | Performed by: ALLERGY & IMMUNOLOGY

## 2022-01-05 PROCEDURE — 3008F BODY MASS INDEX DOCD: CPT | Mod: CPTII,,, | Performed by: ALLERGY & IMMUNOLOGY

## 2022-01-05 PROCEDURE — 1160F PR REVIEW ALL MEDS BY PRESCRIBER/CLIN PHARMACIST DOCUMENTED: ICD-10-PCS | Mod: CPTII,,, | Performed by: ALLERGY & IMMUNOLOGY

## 2022-01-05 PROCEDURE — 1159F PR MEDICATION LIST DOCUMENTED IN MEDICAL RECORD: ICD-10-PCS | Mod: CPTII,,, | Performed by: ALLERGY & IMMUNOLOGY

## 2022-01-05 PROCEDURE — 99213 OFFICE O/P EST LOW 20 MIN: CPT | Mod: PBBFAC,PO | Performed by: ALLERGY & IMMUNOLOGY

## 2022-01-05 PROCEDURE — 99999 PR PBB SHADOW E&M-EST. PATIENT-LVL III: CPT | Mod: PBBFAC,,, | Performed by: ALLERGY & IMMUNOLOGY

## 2022-01-05 PROCEDURE — 99999 PR PBB SHADOW E&M-EST. PATIENT-LVL III: ICD-10-PCS | Mod: PBBFAC,,, | Performed by: ALLERGY & IMMUNOLOGY

## 2022-01-05 PROCEDURE — 1160F RVW MEDS BY RX/DR IN RCRD: CPT | Mod: CPTII,,, | Performed by: ALLERGY & IMMUNOLOGY

## 2022-01-05 PROCEDURE — 86003 ALLG SPEC IGE CRUDE XTRC EA: CPT | Performed by: ALLERGY & IMMUNOLOGY

## 2022-01-05 PROCEDURE — 1159F MED LIST DOCD IN RCRD: CPT | Mod: CPTII,,, | Performed by: ALLERGY & IMMUNOLOGY

## 2022-01-05 PROCEDURE — 99204 PR OFFICE/OUTPT VISIT, NEW, LEVL IV, 45-59 MIN: ICD-10-PCS | Mod: S$PBB,,, | Performed by: ALLERGY & IMMUNOLOGY

## 2022-01-05 PROCEDURE — 3008F PR BODY MASS INDEX (BMI) DOCUMENTED: ICD-10-PCS | Mod: CPTII,,, | Performed by: ALLERGY & IMMUNOLOGY

## 2022-01-05 RX ORDER — EPINEPHRINE 0.3 MG/.3ML
1 INJECTION SUBCUTANEOUS ONCE AS NEEDED
Qty: 2 EACH | Refills: 1 | Status: SHIPPED | OUTPATIENT
Start: 2022-01-05 | End: 2022-01-05

## 2022-01-05 NOTE — PROGRESS NOTES
Subjective:       Patient ID: Khalida Mckeon is a 56 y.o. female.    Chief Complaint:  Allergic Reaction (ER f/u possible allergic reaction.)      57 yo woman presents for new patient evaluation of allergic reaction. She states 12/17 about 11 pm was getting ready for bed. She washed face and brushed teeth and notices under he chin itching then itching on wrist and scratched until had bump. Then lip felt numb and face puffy. Tried to go to bed but right side of throat felt sore and hurt to swallow so got up and face was more swollen and very red so went to ER. Progressively got harder to swallow and felt SOB. ER gave her epi and steroid and benadryl which did help. Observed for few hours until resolved. She had taken no new medication that day. No new activities. She did take her dog outside that night and hen came in her bed so not sure if something was on her dog. She ate a protein bar and monster drink th at morning (has had those since then), about 630 pm stopped at work and was given some crawfish pasta. Then at home before bed had humus which she ha shad since then. She thinks maybe the crawfish were old or something wrong with them. Has had crabs since then but not shrimp or crawfish. She had no illnesses. She has no known allergy, no insect or latex allergy. No chronic rhinitis, asthma or eczema.       Environmental History: see history section for home environment  Review of Systems   Constitutional: Negative for appetite change, chills, fatigue and fever.   HENT: Positive for facial swelling, sore throat, trouble swallowing and voice change. Negative for congestion, ear discharge, ear pain, nosebleeds, postnasal drip, rhinorrhea, sinus pressure and sneezing.    Eyes: Negative for discharge, redness, itching and visual disturbance.   Respiratory: Positive for shortness of breath. Negative for cough, choking, chest tightness and wheezing.    Cardiovascular: Negative for chest pain, palpitations and leg  swelling.   Gastrointestinal: Negative for abdominal distention, abdominal pain, constipation, diarrhea, nausea and vomiting.   Genitourinary: Negative for difficulty urinating.   Musculoskeletal: Negative for arthralgias, gait problem, joint swelling and myalgias.   Skin: Positive for rash. Negative for color change.   Neurological: Negative for dizziness, syncope, weakness, light-headedness and headaches.   Hematological: Negative for adenopathy. Does not bruise/bleed easily.   Psychiatric/Behavioral: Negative for agitation, behavioral problems, confusion and sleep disturbance. The patient is not nervous/anxious.         Objective:      Physical Exam  Vitals and nursing note reviewed.   Constitutional:       General: She is not in acute distress.     Appearance: Normal appearance. She is well-developed and well-nourished. She is not ill-appearing.   HENT:      Head: Normocephalic and atraumatic.      Right Ear: Hearing and external ear normal.      Left Ear: Hearing and external ear normal.      Nose: No septal deviation, sinus tenderness, mucosal edema, rhinorrhea or epistaxis.      Right Sinus: No maxillary sinus tenderness or frontal sinus tenderness.      Left Sinus: No maxillary sinus tenderness or frontal sinus tenderness.      Mouth/Throat:      Mouth: Oropharynx is clear and moist and mucous membranes are normal.      Pharynx: Uvula midline. No uvula swelling.   Eyes:      General:         Right eye: No discharge.         Left eye: No discharge.      Conjunctiva/sclera: Conjunctivae normal.   Neck:      Thyroid: No thyromegaly.   Cardiovascular:      Rate and Rhythm: Normal rate and regular rhythm.   Pulmonary:      Effort: Pulmonary effort is normal. No respiratory distress.   Abdominal:      General: There is no distension.   Musculoskeletal:         General: No edema. Normal range of motion.      Cervical back: Normal range of motion.   Skin:     General: Skin is warm and dry.      Findings: No erythema  or rash.   Neurological:      Mental Status: She is alert and oriented to person, place, and time.   Psychiatric:         Mood and Affect: Mood and affect normal.         Behavior: Behavior normal.         Thought Content: Thought content normal.         Judgment: Judgment normal.         Laboratory:   none performed   Assessment:       1. Anaphylaxis, initial encounter         Plan:       1. Advised pt symptoms are C/w allergic reaction however no obvious trigger, will send immunocaps but may not have cause found  2. Epipen, carry at alt times reviewed when and how to use

## 2022-01-11 LAB
A ALTERNATA IGE QN: <0.1 KU/L
A FUMIGATUS IGE QN: <0.1 KU/L
ALLERGEN CHAETOMIUM GLOBOSUM IGE: <0.1 KU/L
ALLERGEN NAME: NORMAL
ALLERGEN RESULT: NORMAL
ALMOND IGE QN: <0.1 KU/L
BAHIA GRASS IGE QN: <0.1 KU/L
BALD CYPRESS IGE QN: <0.1 KU/L
BERMUDA GRASS IGE QN: 0.11 KU/L
BLACK PEPPER IGE QN: <0.1 KU/L
C HERBARUM IGE QN: <0.1 KU/L
C LUNATA IGE QN: <0.1 KU/L
CAT DANDER IGE QN: <0.1 KU/L
CATFISH IGE QN: <0.1 KU/L
CELERY IGE QN: <0.1 KU/L
CHAETOMIUM GLOB. CLASS: NORMAL
CHILI PEPPER IGE QN: <0.1 KU/L
CLAM IGE QN: <0.1 KU/L
COMMON RAGWEED IGE QN: <0.1 KU/L
COTTONWOOD IGE QN: <0.1 KU/L
CRAB IGE QN: 0.55 KU/L
CRAWFISH IGE QN: 0.52 KU/L
D FARINAE IGE QN: 0.72 KU/L
D PTERONYSS IGE QN: 0.7 KU/L
DEPRECATED A ALTERNATA IGE RAST QL: NORMAL
DEPRECATED A FUMIGATUS IGE RAST QL: NORMAL
DEPRECATED ALMOND IGE RAST QL: NORMAL
DEPRECATED BAHIA GRASS IGE RAST QL: NORMAL
DEPRECATED BALD CYPRESS IGE RAST QL: NORMAL
DEPRECATED BERMUDA GRASS IGE RAST QL: ABNORMAL
DEPRECATED BLACK PEPPER IGE RAST QL: NORMAL
DEPRECATED C HERBARUM IGE RAST QL: NORMAL
DEPRECATED C LUNATA IGE RAST QL: NORMAL
DEPRECATED CAT DANDER IGE RAST QL: NORMAL
DEPRECATED CATFISH IGE RAST QL: NORMAL
DEPRECATED CELERY IGE RAST QL: NORMAL
DEPRECATED CHILI PEPPER IGE RAST QL: NORMAL
DEPRECATED CLAM IGE RAST QL: NORMAL
DEPRECATED COMMON RAGWEED IGE RAST QL: NORMAL
DEPRECATED COTTONWOOD IGE RAST QL: NORMAL
DEPRECATED CRAB IGE RAST QL: ABNORMAL
DEPRECATED CRAWFISH IGE RAST QL: ABNORMAL
DEPRECATED D FARINAE IGE RAST QL: ABNORMAL
DEPRECATED D PTERONYSS IGE RAST QL: ABNORMAL
DEPRECATED DOG DANDER IGE RAST QL: NORMAL
DEPRECATED ELDER IGE RAST QL: NORMAL
DEPRECATED ENGL PLANTAIN IGE RAST QL: NORMAL
DEPRECATED FLOUNDER IGE RAST QL: NORMAL
DEPRECATED GARLIC IGE RAST QL: NORMAL
DEPRECATED GINGER IGE RAST QL: NORMAL
DEPRECATED GREEN PEPPER IGE RAST QL: NORMAL
DEPRECATED JOHNSON GRASS IGE RAST QL: NORMAL
DEPRECATED LOBSTER IGE RAST QL: ABNORMAL
DEPRECATED LONDON PLANE IGE RAST QL: NORMAL
DEPRECATED MUGWORT IGE RAST QL: NORMAL
DEPRECATED MUSTARD IGE RAST QL: NORMAL
DEPRECATED ONION IGE RAST QL: NORMAL
DEPRECATED OREGANO IGE RAST QL: NORMAL
DEPRECATED OYSTER IGE RAST QL: NORMAL
DEPRECATED P NOTATUM IGE RAST QL: NORMAL
DEPRECATED PAPRIKA IGE RAST QL: NORMAL
DEPRECATED PEANUT IGE RAST QL: NORMAL
DEPRECATED PECAN/HICK NUT IGE RAST QL: NORMAL
DEPRECATED PECAN/HICK TREE IGE RAST QL: NORMAL
DEPRECATED ROACH IGE RAST QL: ABNORMAL
DEPRECATED S ROSTRATA IGE RAST QL: NORMAL
DEPRECATED SALMON IGE RAST QL: NORMAL
DEPRECATED SALTWORT IGE RAST QL: NORMAL
DEPRECATED SCALLOP IGE RAST QL: NORMAL
DEPRECATED SESAME SEED IGE RAST QL: NORMAL
DEPRECATED SHRIMP IGE RAST QL: ABNORMAL
DEPRECATED SILVER BIRCH IGE RAST QL: NORMAL
DEPRECATED SOYBEAN IGE RAST QL: NORMAL
DEPRECATED SUNFLOWER SEED IGE RAST QL: NORMAL
DEPRECATED TIMOTHY IGE RAST QL: NORMAL
DEPRECATED TROUT IGE RAST QL: NORMAL
DEPRECATED TUNA IGE RAST QL: NORMAL
DEPRECATED WHITE OAK IGE RAST QL: NORMAL
DEPRECATED WILLOW IGE RAST QL: NORMAL
DOG DANDER IGE QN: <0.1 KU/L
ELDER IGE QN: <0.1 KU/L
ENGL PLANTAIN IGE QN: <0.1 KU/L
FLOUNDER IGE QN: <0.1 KU/L
GARLIC IGE QN: <0.1 KU/L
GINGER IGE QN: <0.1 KU/L
GREEN PEPPER IGE QN: <0.1 KU/L
JOHNSON GRASS IGE QN: <0.1 KU/L
LOBSTER IGE QN: 0.44 KU/L
LONDON PLANE IGE QN: <0.1 KU/L
MUGWORT IGE QN: <0.1 KU/L
MUSTARD IGE QN: <0.1 KU/L
ONION IGE QN: <0.1 KU/L
OREGANO IGE QN: <0.1 KU/L
OYSTER IGE QN: <0.1 KU/L
P NOTATUM IGE QN: <0.1 KU/L
PAPRIKA IGE QN: <0.1 KU/L
PEANUT IGE QN: <0.1 KU/L
PECAN/HICK NUT IGE QN: <0.1 KU/L
PECAN/HICK TREE IGE QN: <0.1 KU/L
ROACH IGE QN: 0.79 KU/L
S ROSTRATA IGE QN: <0.1 KU/L
SALMON IGE QN: <0.1 KU/L
SALTWORT IGE QN: <0.1 KU/L
SCALLOP IGE QN: <0.1 KU/L
SESAME SEED IGE QN: 0.1 KU/L
SHRIMP IGE QN: 0.96 KU/L
SILVER BIRCH IGE QN: <0.1 KU/L
SOYBEAN IGE QN: <0.1 KU/L
SUNFLOWER SEED IGE QN: <0.1 KU/L
TIMOTHY IGE QN: <0.1 KU/L
TROUT IGE QN: <0.1 KU/L
TUNA IGE QN: <0.1 KU/L
WHITE OAK IGE QN: <0.1 KU/L
WILLOW IGE QN: <0.1 KU/L

## 2022-01-13 ENCOUNTER — PATIENT MESSAGE (OUTPATIENT)
Dept: ALLERGY | Facility: CLINIC | Age: 57
End: 2022-01-13
Payer: MEDICAID

## 2022-05-13 ENCOUNTER — HOSPITAL ENCOUNTER (EMERGENCY)
Facility: HOSPITAL | Age: 57
Discharge: HOME | End: 2022-05-13
Attending: EMERGENCY MEDICINE
Payer: MEDICAID

## 2022-05-13 VITALS
HEART RATE: 70 BPM | BODY MASS INDEX: 22.82 KG/M2 | RESPIRATION RATE: 17 BRPM | HEIGHT: 62 IN | WEIGHT: 124 LBS | SYSTOLIC BLOOD PRESSURE: 114 MMHG | OXYGEN SATURATION: 96 % | TEMPERATURE: 99 F | DIASTOLIC BLOOD PRESSURE: 58 MMHG

## 2022-05-13 DIAGNOSIS — N12 PYELONEPHRITIS: Primary | ICD-10-CM

## 2022-05-13 LAB
ALBUMIN SERPL BCP-MCNC: 3.3 G/DL (ref 3.5–5.2)
ALP SERPL-CCNC: 170 U/L (ref 55–135)
ALT SERPL W/O P-5'-P-CCNC: 43 U/L (ref 10–44)
ANION GAP SERPL CALC-SCNC: 15 MMOL/L (ref 8–16)
AST SERPL-CCNC: 40 U/L (ref 10–40)
BACTERIA #/AREA URNS HPF: ABNORMAL /HPF
BASOPHILS # BLD AUTO: 0.04 K/UL (ref 0–0.2)
BASOPHILS NFR BLD: 0.3 % (ref 0–1.9)
BILIRUB SERPL-MCNC: 0.5 MG/DL (ref 0.1–1)
BILIRUB UR QL STRIP: NEGATIVE
BUN SERPL-MCNC: 10 MG/DL (ref 6–20)
CALCIUM SERPL-MCNC: 9.4 MG/DL (ref 8.7–10.5)
CHLORIDE SERPL-SCNC: 94 MMOL/L (ref 95–110)
CLARITY UR: ABNORMAL
CO2 SERPL-SCNC: 23 MMOL/L (ref 23–29)
COLOR UR: YELLOW
CREAT SERPL-MCNC: 0.7 MG/DL (ref 0.5–1.4)
DIFFERENTIAL METHOD: ABNORMAL
EOSINOPHIL # BLD AUTO: 0 K/UL (ref 0–0.5)
EOSINOPHIL NFR BLD: 0.1 % (ref 0–8)
ERYTHROCYTE [DISTWIDTH] IN BLOOD BY AUTOMATED COUNT: 13.9 % (ref 11.5–14.5)
EST. GFR  (AFRICAN AMERICAN): >60 ML/MIN/1.73 M^2
EST. GFR  (NON AFRICAN AMERICAN): >60 ML/MIN/1.73 M^2
GLUCOSE SERPL-MCNC: 109 MG/DL (ref 70–110)
GLUCOSE UR QL STRIP: NEGATIVE
HCT VFR BLD AUTO: 37.6 % (ref 37–48.5)
HGB BLD-MCNC: 12.5 G/DL (ref 12–16)
HGB UR QL STRIP: ABNORMAL
IMM GRANULOCYTES # BLD AUTO: 0.1 K/UL (ref 0–0.04)
IMM GRANULOCYTES NFR BLD AUTO: 0.7 % (ref 0–0.5)
INFLUENZA A, MOLECULAR: NEGATIVE
INFLUENZA B, MOLECULAR: NEGATIVE
KETONES UR QL STRIP: ABNORMAL
LACTATE SERPL-SCNC: 0.9 MMOL/L (ref 0.5–2.2)
LEUKOCYTE ESTERASE UR QL STRIP: ABNORMAL
LYMPHOCYTES # BLD AUTO: 0.8 K/UL (ref 1–4.8)
LYMPHOCYTES NFR BLD: 5.7 % (ref 18–48)
MCH RBC QN AUTO: 28.6 PG (ref 27–31)
MCHC RBC AUTO-ENTMCNC: 33.2 G/DL (ref 32–36)
MCV RBC AUTO: 86 FL (ref 82–98)
MICROSCOPIC COMMENT: ABNORMAL
MONOCYTES # BLD AUTO: 1.7 K/UL (ref 0.3–1)
MONOCYTES NFR BLD: 12.1 % (ref 4–15)
NEUTROPHILS # BLD AUTO: 11.6 K/UL (ref 1.8–7.7)
NEUTROPHILS NFR BLD: 81.1 % (ref 38–73)
NITRITE UR QL STRIP: POSITIVE
NRBC BLD-RTO: 0 /100 WBC
PH UR STRIP: 6 [PH] (ref 5–8)
PLATELET # BLD AUTO: 162 K/UL (ref 150–450)
PMV BLD AUTO: 10.7 FL (ref 9.2–12.9)
POTASSIUM SERPL-SCNC: 3.7 MMOL/L (ref 3.5–5.1)
PROT SERPL-MCNC: 7.5 G/DL (ref 6–8.4)
PROT UR QL STRIP: NEGATIVE
RBC # BLD AUTO: 4.37 M/UL (ref 4–5.4)
RBC #/AREA URNS HPF: 12 /HPF (ref 0–4)
SARS-COV-2 RDRP RESP QL NAA+PROBE: NEGATIVE
SODIUM SERPL-SCNC: 132 MMOL/L (ref 136–145)
SP GR UR STRIP: 1.01 (ref 1–1.03)
SPECIMEN SOURCE: NORMAL
SQUAMOUS #/AREA URNS HPF: 7 /HPF
URN SPEC COLLECT METH UR: ABNORMAL
UROBILINOGEN UR STRIP-ACNC: NEGATIVE EU/DL
WBC # BLD AUTO: 14.32 K/UL (ref 3.9–12.7)
WBC #/AREA URNS HPF: >100 /HPF (ref 0–5)

## 2022-05-13 PROCEDURE — 87077 CULTURE AEROBIC IDENTIFY: CPT | Performed by: EMERGENCY MEDICINE

## 2022-05-13 PROCEDURE — 83605 ASSAY OF LACTIC ACID: CPT | Performed by: EMERGENCY MEDICINE

## 2022-05-13 PROCEDURE — 99285 EMERGENCY DEPT VISIT HI MDM: CPT | Mod: 25

## 2022-05-13 PROCEDURE — 87502 INFLUENZA DNA AMP PROBE: CPT | Performed by: EMERGENCY MEDICINE

## 2022-05-13 PROCEDURE — 96365 THER/PROPH/DIAG IV INF INIT: CPT

## 2022-05-13 PROCEDURE — 85025 COMPLETE CBC W/AUTO DIFF WBC: CPT | Performed by: EMERGENCY MEDICINE

## 2022-05-13 PROCEDURE — 87088 URINE BACTERIA CULTURE: CPT | Performed by: EMERGENCY MEDICINE

## 2022-05-13 PROCEDURE — 36415 COLL VENOUS BLD VENIPUNCTURE: CPT | Performed by: EMERGENCY MEDICINE

## 2022-05-13 PROCEDURE — 63600175 PHARM REV CODE 636 W HCPCS: Performed by: EMERGENCY MEDICINE

## 2022-05-13 PROCEDURE — 87186 SC STD MICRODIL/AGAR DIL: CPT | Performed by: EMERGENCY MEDICINE

## 2022-05-13 PROCEDURE — 96375 TX/PRO/DX INJ NEW DRUG ADDON: CPT

## 2022-05-13 PROCEDURE — U0002 COVID-19 LAB TEST NON-CDC: HCPCS | Performed by: EMERGENCY MEDICINE

## 2022-05-13 PROCEDURE — 80053 COMPREHEN METABOLIC PANEL: CPT | Performed by: EMERGENCY MEDICINE

## 2022-05-13 PROCEDURE — 87086 URINE CULTURE/COLONY COUNT: CPT | Performed by: EMERGENCY MEDICINE

## 2022-05-13 PROCEDURE — 87040 BLOOD CULTURE FOR BACTERIA: CPT | Mod: 59 | Performed by: EMERGENCY MEDICINE

## 2022-05-13 PROCEDURE — 81000 URINALYSIS NONAUTO W/SCOPE: CPT | Performed by: EMERGENCY MEDICINE

## 2022-05-13 PROCEDURE — 25000003 PHARM REV CODE 250: Performed by: EMERGENCY MEDICINE

## 2022-05-13 RX ORDER — CEFDINIR 300 MG/1
300 CAPSULE ORAL 2 TIMES DAILY
Qty: 20 CAPSULE | Refills: 0 | Status: SHIPPED | OUTPATIENT
Start: 2022-05-13 | End: 2022-05-23

## 2022-05-13 RX ORDER — ACETAMINOPHEN 500 MG
1000 TABLET ORAL
Status: COMPLETED | OUTPATIENT
Start: 2022-05-13 | End: 2022-05-13

## 2022-05-13 RX ORDER — KETOROLAC TROMETHAMINE 30 MG/ML
10 INJECTION, SOLUTION INTRAMUSCULAR; INTRAVENOUS
Status: COMPLETED | OUTPATIENT
Start: 2022-05-13 | End: 2022-05-13

## 2022-05-13 RX ADMIN — CEFTRIAXONE 1 G: 1 INJECTION, SOLUTION INTRAVENOUS at 09:05

## 2022-05-13 RX ADMIN — KETOROLAC TROMETHAMINE 10 MG: 30 INJECTION, SOLUTION INTRAMUSCULAR at 09:05

## 2022-05-13 RX ADMIN — ACETAMINOPHEN 1000 MG: 500 TABLET ORAL at 09:05

## 2022-05-13 RX ADMIN — SODIUM CHLORIDE, SODIUM LACTATE, POTASSIUM CHLORIDE, AND CALCIUM CHLORIDE 1000 ML: .6; .31; .03; .02 INJECTION, SOLUTION INTRAVENOUS at 09:05

## 2022-05-14 NOTE — ED PROVIDER NOTES
Encounter Date: 5/13/2022    SCRIBE #1 NOTE: I, Melany Frandy, am scribing for, and in the presence of, Talon Miranda MD.       History     Chief Complaint   Patient presents with    Generalized Body Aches     With left flank pain. Patient reports she felt like she had covid, negative home covid test     Time seen by provider: 9:01 PM on 05/13/2022    Khalida Mckeon is a 57 y.o. female with a PMHx of migraines and kidney stones who presents to the ED with an onset of left sided flank pain, upper back pain, persistent headache, myalgias, fatigue, and fever for the past three days (5/10/22). Patient has been taking Imitrex with minimal relief of her headache. The flank and back pain have improved after she drank copious amounts of water.  She denies n/v/d, abd pain, cough, rhinorrhea, throat pain, or any other Sx at this time. Patient states her flank pain feels similar to when she was diagnosed with kidney stones in the past. Her previous stones have caused obstructions which resulted in surgical ureteral stent placements. Patient's last stent was removed in January 2021. Patient states she did an at-home COVID test today which was negative.    The history is provided by the patient.     Review of patient's allergies indicates:   Allergen Reactions    Pcn [penicillins] Other (See Comments)     Heart racing     Past Medical History:   Diagnosis Date    Hyperparathyroidism     Kidney stone     Migraines     Nephrolithiasis     Thyroid disease      Past Surgical History:   Procedure Laterality Date    CYSTOSCOPY W/ URETERAL STENT REMOVAL Right 11/13/2018    Procedure: CYSTOSCOPY, WITH URETERAL STENT REMOVAL;  Surgeon: Chandler Pritchard MD;  Location: [facility] OR;  Service: Urology;  Laterality: Right;    CYSTOSCOPY W/ URETERAL STENT REMOVAL Right 2/1/2021    Procedure: CYSTOSCOPY WITH STENT REMOVAL;  Surgeon: Tanesha Valderrama MD;  Location: [facility] OR;  Service: Urology;  Laterality: Right;    CYSTOSCOPY  WITH URETEROSCOPY, RETROGRADE PYELOGRAPHY, AND INSERTION OF STENT Right 10/18/2018    Procedure: CYSTOSCOPY, WITH RETROGRADE PYELOGRAM AND URETERAL STENT INSERTION;  Surgeon: Chandler Pritchard MD;  Location: [facility] OR;  Service: Urology;  Laterality: Right;    CYSTOSCOPY WITH URETEROSCOPY, RETROGRADE PYELOGRAPHY, AND INSERTION OF STENT Right 2018    Procedure: CYSTOSCOPY, WITH RETROGRADE PYELOGRAM AND URETERAL STENT INSERTION;  Surgeon: Chandler Pritchard MD;  Location: [facility] OR;  Service: Urology;  Laterality: Right;    CYSTOURETEROSCOPY WITH RETROGRADE PYELOGRAPHY AND INSERTION OF STENT INTO URETER Right 2021    Procedure: CYSTOURETEROSCOPY, WITH RETROGRADE PYELOGRAM AND URETERAL STENT INSERTION;  Surgeon: Tanesha Valderrama MD;  Location: [facility] OR;  Service: Urology;  Laterality: Right;    LASER LITHOTRIPSY Right 10/18/2018    Procedure: LITHOTRIPSY, USING LASER;  Surgeon: Chandler Pritchard MD;  Location: [facility] OR;  Service: Urology;  Laterality: Right;    LASER LITHOTRIPSY Right 2018    Procedure: LITHOTRIPSY, USING LASER;  Surgeon: Chandler Pritchard MD;  Location: [facility] OR;  Service: Urology;  Laterality: Right;    REMOVAL OF URETERAL CALCULUS Right 2018    Procedure: REMOVAL, CALCULUS, URETER;  Surgeon: Chandler Pritchard MD;  Location: [facility] OR;  Service: Urology;  Laterality: Right;    TUBAL LIGATION      URETEROSCOPIC REMOVAL OF URETERIC CALCULUS Right 10/18/2018    Procedure: REMOVAL, CALCULUS, URETER, URETEROSCOPIC;  Surgeon: Chandler Pritchard MD;  Location: [facility] OR;  Service: Urology;  Laterality: Right;     Family History   Problem Relation Age of Onset    Hyperlipidemia Mother     Hypertension Mother     Diabetes Mother     Cancer Father      Social History     Tobacco Use    Smoking status: Former Smoker     Packs/day: 0.50     Years: 20.00     Pack years: 10.00     Types: Cigarettes     Quit date: 10/9/2014     Years since quittin.5    Smokeless tobacco: Never  Used   Substance Use Topics    Alcohol use: Yes     Alcohol/week: 7.0 standard drinks     Types: 7 Glasses of wine per week    Drug use: No     Review of Systems   Constitutional: Positive for fatigue and fever.   HENT: Negative for rhinorrhea and sore throat.    Respiratory: Negative for cough and shortness of breath.    Cardiovascular: Negative for chest pain.   Gastrointestinal: Negative for abdominal pain, diarrhea, nausea and vomiting.   Genitourinary: Negative for dysuria.   Musculoskeletal: Positive for back pain (upper) and myalgias.        Positive for left flank pain.   Skin: Negative for rash.   Neurological: Positive for headaches. Negative for weakness.   Hematological: Does not bruise/bleed easily.       Physical Exam     Initial Vitals [05/13/22 1912]   BP Pulse Resp Temp SpO2   126/76 91 16 (!) 102.9 °F (39.4 °C) 95 %      MAP       --         Physical Exam    Nursing note and vitals reviewed.  Constitutional: She appears well-developed.  Non-toxic appearance.   HENT:   Head: Normocephalic and atraumatic.   Eyes: EOM are normal. Pupils are equal, round, and reactive to light.   No photophobia. No meningismus.   Neck: Neck supple.   Cardiovascular: Normal rate, regular rhythm, normal heart sounds and intact distal pulses. Exam reveals no gallop and no friction rub.    No murmur heard.  Pulmonary/Chest: Breath sounds normal. No respiratory distress. She has no decreased breath sounds. She has no wheezes. She has no rhonchi. She has no rales.   Abdominal: Abdomen is soft. Bowel sounds are normal. She exhibits no distension.   There is left CVA tenderness (mild).   Musculoskeletal:         General: Normal range of motion.      Cervical back: Neck supple.     Neurological: She is alert and oriented to person, place, and time.   Skin: Skin is warm and dry.   Psychiatric: She has a normal mood and affect.         ED Course   Procedures  Labs Reviewed   CBC W/ AUTO DIFFERENTIAL - Abnormal; Notable for the  following components:       Result Value    WBC 14.32 (*)     Immature Granulocytes 0.7 (*)     Gran # (ANC) 11.6 (*)     Immature Grans (Abs) 0.10 (*)     Lymph # 0.8 (*)     Mono # 1.7 (*)     Gran % 81.1 (*)     Lymph % 5.7 (*)     All other components within normal limits   COMPREHENSIVE METABOLIC PANEL - Abnormal; Notable for the following components:    Sodium 132 (*)     Chloride 94 (*)     Albumin 3.3 (*)     Alkaline Phosphatase 170 (*)     All other components within normal limits   URINALYSIS, REFLEX TO URINE CULTURE - Abnormal; Notable for the following components:    Appearance, UA Hazy (*)     Ketones, UA 3+ (*)     Occult Blood UA 3+ (*)     Nitrite, UA Positive (*)     Leukocytes, UA 1+ (*)     All other components within normal limits    Narrative:     Specimen Source->Urine   URINALYSIS MICROSCOPIC - Abnormal; Notable for the following components:    RBC, UA 12 (*)     WBC, UA >100 (*)     Bacteria Many (*)     All other components within normal limits    Narrative:     Specimen Source->Urine   INFLUENZA A & B BY MOLECULAR   CULTURE, BLOOD   CULTURE, BLOOD   CULTURE, URINE   SARS-COV-2 RNA AMPLIFICATION, QUAL   LACTIC ACID, PLASMA          Imaging Results          CT Renal Stone Study ABD Pelvis WO (Final result)  Result time 05/13/22 22:14:31    Final result by Mena Grimes MD (05/13/22 22:14:31)                 Impression:      No evidence of acute abnormality involving the abdomen or pelvis as imaged.    No obstructive uropathy or opaque renal calculi in the urinary tract in this patient with left-sided flank pain.    Interval removal right ureteral stent.    Stable hepatomegaly.      Electronically signed by: Mena Grimes  Date:    05/13/2022  Time:    22:14             Narrative:    EXAMINATION:  CT RENAL STONE STUDY ABD PELVIS WO    CLINICAL HISTORY:  Flank pain, kidney stone suspected;    TECHNIQUE:  Low dose axial images, sagittal and coronal reformations were obtained from the  lung bases to the pubic symphysis without oral or IV contrast    COMPARISON:  CT abdomen pelvis 01/13/2021    FINDINGS:  Abdomen:    - Lung bases: There is mild dependent atelectasis in the right lung.  No significant interstitial or airspace opacity or nodules.  Visualized heart is not enlarged.    The lack of intravenous contrast limits evaluation of the solid organs for focal lesions.    - Liver: The liver appears homogeneous as imaged and is enlarged.    - Gallbladder: No calcified gallstones.    - Bile Ducts: No evidence of intra or extra hepatic biliary ductal dilation.    - Spleen: Spleen contains multiple granulomas.    - Kidneys: The right ureteral stent is been removed and there are no calculi in the right kidney.  There are no appreciable cortical lesions there is no hydronephrosis.    The left kidney cortex a appears homogeneous and there are no opaque calculi.  There is no hydronephrosis or ureterectasis.    - Adrenals: Unremarkable.    - Pancreas: No mass or peripancreatic fat stranding.    - Retroperitoneum:  No significant adenopathy.    - Vascular: There is no abdominal aortic aneurysm.  Mild atherosclerotic calcifications are noted in the infrarenal aorta and iliac arteries.  It    - Abdominal wall:  Unremarkable.    Pelvis:    No pelvic mass, adenopathy, or free fluid.  Uterus and adnexal structures appear normal.    Bowel/Mesentery: The appendix appears normal.  There is no evidence of bowel obstruction or inflammation.    Bones:  No acute osseous abnormality and no suspicious lytic or blastic lesion. There is a remote wedge compression fracture of the L4 vertebra with Schmorl's node formation also noted stable since prior.  No acute subluxation is seen.                                 Medications   lactated ringers bolus 1,000 mL (1,000 mLs Intravenous New Bag 5/13/22 2126)   acetaminophen tablet 1,000 mg (1,000 mg Oral Given 5/13/22 2130)   cefTRIAXone (ROCEPHIN) 1 g/50 mL D5W IVPB (0 g  Intravenous Stopped 5/13/22 2225)   ketorolac injection 9.999 mg (9.999 mg Intravenous Given 5/13/22 2153)     Medical Decision Making:   History:   Old Medical Records: I decided to obtain old medical records.  Clinical Tests:   Lab Tests: Reviewed and Ordered  Radiological Study: Reviewed and Ordered  Patient has pyelonephritis but no signs of ureterolithiasis.  I do not think she is septic at this time.  She is stable for discharge.  Blood cultures and urine cultures have been ordered.  Lactic acid is 0.9.          Scribe Attestation:   Scribe #1: I performed the above scribed service and the documentation accurately describes the services I performed. I attest to the accuracy of the note.        ED Course as of 05/13/22 2257   Fri May 13, 2022   2137 CT Renal Stone Study ABD Pelvis WO [JS]   2138 Patient appears to have urinary tract infection and likely pyelonephritis.  No signs of meningitis on exam.  She will be given IV antibiotics.  Awaiting CT read to evaluate for ureterolithiasis [JS]      ED Course User Index  [JS] Talon Miranda MD           I, Dr. Talon Miranda personally performed the services described in this documentation. All medical record entries made by the scribe were at my direction and in my presence.  I have reviewed the chart and agree that the record reflects my personal performance and is accurate and complete. Talon Miranda MD.  10:58 PM 05/13/2022    DISCLAIMER: This note was prepared with Dragon NaturallySpeaking voice recognition transcription software. Garbled syntax, mangled pronouns, and other bizarre constructions may be attributed to that software system   Clinical Impression:   Final diagnoses:  [N12] Pyelonephritis (Primary)          ED Disposition Condition    Discharge Stable        ED Prescriptions     Medication Sig Dispense Start Date End Date Auth. Provider    cefdinir (OMNICEF) 300 MG capsule Take 1 capsule (300 mg total) by mouth 2 (two) times daily. for 10 days  20 capsule 5/13/2022 5/23/2022 Talon Miranda MD        Follow-up Information     Follow up With Specialties Details Why Contact Info    Isiah Baltazar MD Internal Medicine Schedule an appointment as soon as possible for a visit   [address] [place] [address] [address]  [facility] [address]  [phone]      [facility] Emergency Dept Emergency Medicine  If symptoms worsen [address] [place] [id number]  [phone]           Talon Miranda MD  05/13/22 2254

## 2022-05-14 NOTE — ED NOTES
Patient reporting left flank pain with generalized body aches, fever and headache. Patient reporting that she does  Have hx of kidney stones.

## 2022-05-17 ENCOUNTER — PATIENT OUTREACH (OUTPATIENT)
Dept: EMERGENCY MEDICINE | Facility: HOSPITAL | Age: 57
End: 2022-05-17
Payer: MEDICAID

## 2022-05-17 LAB — BACTERIA UR CULT: ABNORMAL

## 2022-05-17 NOTE — PROGRESS NOTES
Kaley Gaston  ED Navigator  Emergency Department    Project: [facility] ED Navigator  Role: Community Health Worker    Date: 05/17/2022  Patient Name: Khalida Mckeon  MRN: [id number]  PCP: Isiah Baltazar MD    Assessment:     Khalida Mckeon is a 57 y.o. female who has presented to ED for generalized body aches. Patient has visited the ED 1 times in the past 3 months. Patient did not contact PCP.     ED Navigator Initial Assessment    ED Navigator Enrollment Documentation  Consent to Services  Does patient consent to completing the assessment?: Yes  Contact  Method of Initial Contact: Phone  Transportation  Does the patient have issues with Transportation?: No  Does the patient have transportation to and from healthcare appointments?: Yes  Insurance Coverage  Do you have coverage/adequate coverage?: Yes  Type/kind of coverage: Medicaid/Healthy Blue  Is patient able to afford co-pays/deductibles?: Yes  Is patient able to afford HME or supplies?: Yes  Does patient have an established [facility] PCP?: Yes  Able to access?: Yes  Does the patient have a lack of adequate coverage?: No  Specialist Appointment  Did the patient come to the ED to see a specialist?: No  Does the patient have a pending specialist referral?: No  Does the patient have a specialist appointment made?: No  PCP Follow Up Appointment  Has the patient had an appointment with a primary care provider in the past year?: Yes  Provider: Isiah Baltazar MD  Does the patient have a follow up appontment with a PCP?: Yes  Upcoming appointment date: 5/24/22  Provider: Isiah Baltazar MD  Medications  Is patient able to afford medication?: Yes  Is patient unable to get medication due to lack of transportation?: No  Psychological  Does the patient have psycho-social concerns?: No  Food  Does the patient have concerns about food?: No  Communication/Education  Does the patient have limited English proficiency/English not primary language?: No  Does patient have low literacy  and/or low health literacy?: Yes  Does patient have concerns with care?: No  Does patient have dissatisfaction with care?: No  Other Financial Concerns  Does the patient have immediate financial distress?: No  Does the patient have general financial concerns?: No  Other Social Barriers/Concerns  Does the patient have any additional barriers or concerns?: None  Primary Barrier  Barriers identified: Patient identified no barriers to care  Root Cause of ED Utilization: Patient Knowledge/Low Health Literacy  Plan to address Patient Knowledge/Low Health Literacy: Provided information for [facility]  Nurse triage line [phone] or 1-866-[facility] ([phone])  Next steps: Provided Education  Was education/educational materials provided surrounding PCP services/creating a medical home?: Yes Was education verbal or written?: Written   Was education/educational materials provided surrounding low cost, healthy foods?: Yes Was education verbal or written?: Written   Was education/educational materials provided surrounding other items? If so, use comment to explain.: No    Plan: Provided information for [facility]  Nurse triage line, [phone] or 1-866-[facility] ([phone])  Expected Date of Follow Up 1: 22         Social History     Socioeconomic History    Marital status:    Tobacco Use    Smoking status: Former Smoker     Packs/day: 0.50     Years: 20.00     Pack years: 10.00     Types: Cigarettes     Quit date: 10/9/2014     Years since quittin.6    Smokeless tobacco: Never Used   Substance and Sexual Activity    Alcohol use: Yes     Alcohol/week: 7.0 standard drinks     Types: 7 Glasses of wine per week    Drug use: No     Social Determinants of Health     Financial Resource Strain: Low Risk     Difficulty of Paying Living Expenses: Not hard at all   Food Insecurity: No Food Insecurity    Worried About Running Out of Food in the Last Year: Never true    Ran Out of Food  in the Last Year: Never true   Transportation Needs: No Transportation Needs    Lack of Transportation (Medical): No    Lack of Transportation (Non-Medical): No   Physical Activity: Sufficiently Active    Days of Exercise per Week: 5 days    Minutes of Exercise per Session: 30 min   Stress: No Stress Concern Present    Feeling of Stress : Not at all   Social Connections: Unknown    Frequency of Communication with Friends and Family: Three times a week    Frequency of Social Gatherings with Friends and Family: Three times a week    Marital Status:    Housing Stability: Unknown    Unable to Pay for Housing in the Last Year: No    Unstable Housing in the Last Year: No       Plan:   ED Navigator spoke with patient on the telephone and completed initial assessment.  Patient denied any concerns with food, transportation, housing, or utilities.  Patient has a PCP and was supposed to see him today, however, he was going out of town, so she will go next week.  Patient stated she has all other providers in place.  Patient is not a smoker.  Patient reported no additional needs or concerns.  The following resources were emailed to patient:  [facility] brochure, [facility] Nurse Triage line, information on virtual medicine, and eating healthy tip sheet.      Kaley Gaston  ED Navigator

## 2022-05-19 LAB
BACTERIA BLD CULT: NORMAL
BACTERIA BLD CULT: NORMAL

## 2022-08-26 ENCOUNTER — PATIENT OUTREACH (OUTPATIENT)
Dept: EMERGENCY MEDICINE | Facility: HOSPITAL | Age: 57
End: 2022-08-26
Payer: MEDICAID

## 2023-08-24 DIAGNOSIS — G56.01 CARPAL TUNNEL SYNDROME, RIGHT: Primary | ICD-10-CM

## 2023-09-05 ENCOUNTER — TELEPHONE (OUTPATIENT)
Dept: REHABILITATION | Facility: HOSPITAL | Age: 58
End: 2023-09-05

## 2023-09-05 ENCOUNTER — CLINICAL SUPPORT (OUTPATIENT)
Dept: REHABILITATION | Facility: HOSPITAL | Age: 58
End: 2023-09-05
Payer: MEDICAID

## 2023-09-05 DIAGNOSIS — M25.641 STIFFNESS OF RIGHT HAND JOINT: ICD-10-CM

## 2023-09-05 DIAGNOSIS — G56.01 CARPAL TUNNEL SYNDROME, RIGHT: ICD-10-CM

## 2023-09-05 DIAGNOSIS — M79.641 RIGHT HAND PAIN: Primary | ICD-10-CM

## 2023-09-05 DIAGNOSIS — R29.898 RIGHT HAND WEAKNESS: ICD-10-CM

## 2023-09-05 PROCEDURE — 97110 THERAPEUTIC EXERCISES: CPT | Mod: PN

## 2023-09-05 PROCEDURE — 97165 OT EVAL LOW COMPLEX 30 MIN: CPT | Mod: PN

## 2023-09-05 NOTE — PATIENT INSTRUCTIONS
9-5-23   -keep bandaid and sutures dry, change once a day, change immediately if it gets wet  -use hand for very light painless nonrepetitive use only (dressing, eating, hygiene, etc.)  -do below exercises 10 times, 6 x day (medium speed, medium force)      -keep digits loose for exercise # 1

## 2023-09-05 NOTE — PLAN OF CARE
[facility] Occupational Therapy  Initial Evaluation     Date: 9/5/2023  Name: Khalida Mckeon  Clinic Number: [id number]    Therapy Diagnosis:   Encounter Diagnoses   Name Primary?    Carpal tunnel syndrome, right     Right hand pain Yes    Stiffness of right hand joint     Right hand weakness      Physician: Reynold Conner MD    Physician Orders: evaluate and tx, see epic  Medical Diagnosis: right carpal tunnel syndrome  Surgical Procedure and Date: right carpal tunnel release, 9-1-23  Evaluation Date: 9/5/2023  Insurance Authorization Period Expiration: referral [id number] 9-5-23 thru 9-30-23              Plan of Care Certification Period: 9-5-23 thru 11-28-23  Date of Return to referral source's office: 9-7-23     Visit # / Visits authorized: 1 / 1 referral [id number] 9-5-23 thru 9-30-23  Time In:215  Time Out: 245  Total Billable Time: 15 minutes  Precautions:  universal, see epic, protocol if applicable  Subjective     Involved Side: right  Dominant Side: right  Date of Onset: years ago  History of Current Condition/Mechanism of Injury: insidious onset, had surgery, here for OT  Imaging: see epic  Previous Therapy: none for above surgery    Past Medical History/Physical Systems Review:   Khalida Mckeon  has a past medical history of Hyperparathyroidism, Kidney stone, Migraines, Nephrolithiasis, and Thyroid disease.    Khalida Mckeon  has a past surgical history that includes Tubal ligation; Cystoscopy with ureteroscopy, retrograde pyelography, and insertion of stent (Right, 10/18/2018); Ureteroscopic removal of ureteric calculus (Right, 10/18/2018); Laser lithotripsy (Right, 10/18/2018); Removal of ureteral calculus (Right, 11/1/2018); Cystoscopy with ureteroscopy, retrograde pyelography, and insertion of stent (Right, 11/1/2018); Laser lithotripsy (Right, 11/1/2018); Cystoscopy w/ ureteral stent removal (Right, 11/13/2018); Cystoureteroscopy with retrograde pyelography and insertion of  stent into ureter (Right, 1/9/2021); and Cystoscopy w/ ureteral stent removal (Right, 2/1/2021).    Khalida has a current medication list which includes the following prescription(s): biotin, cholecalciferol (vitamin d3), citalopram, epinephrine, levothyroxine, and multivitamin.    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     Current Functional Status   Home/Living environment : lives with nobody    Limitation of Functional Status as follows: decreased motion, use, and strength with ADL   ADLs/IADLs:               See above   Leisure: not tested  pain meds: OTC  nicotine: denies  caffeine: 1 energy drink daily    Objective   Posture:sutures in place with no outward signs of infection, bandaid in place  Palpation:not tested  Sensation:mild constant tingling radial 3 digits' tips  ROM:    Prom                              right         left  Df/pf                               68/70      90/90  Rd/ud                             20/40     20/40  Digits all planes              full          full          Edema: mild, diffuse at wrist        CMS Impairment/Limitation/Restriction for FOTO Survey    Therapist reviewed FOTO scores for Khalida Mckeon on 9/5/2023.   FOTO documents entered into EPIC - see Media section.    intake Score: 40%               Treatment     Treatment Time In: 230  Treatment Time Out: 245  Total Treatment time separate from Evaluation  time:15      Khalida received therapeutic exercises for 15 minutes including:  -see above and/or media section                Home Exercise Program/Education:  Issued HEP (see patient instructions in EMR in media or note) . Exercises were reviewed and Khalida was able to demonstrate them prior to the end of the session.   Pt received a written copy of exercises to perform at home. Khalida demonstrated good understanding of the education provided.  Pt was advised to perform these exercises free of pain, and to stop performing them if pain occurs.    Patient/Family Education: role of OT, goals for OT, scheduling/cancellations - pt verbalized understanding. Discussed insurance limitations with patient.    Additional Education provided: likely tx progression, expectations of rehab    Assessment     Khalida Mckeon is a 58 y.o. female referred to outpatient occupational therapy and presents with a medical diagnosis of see above resulting in Decreased ROM, Decreased muscle strength, Decreased functional hand use, Increased pain, Edema, Joint Stiffness, Scar Adhesions, and Diminished/Impaired Sensation and demonstrates limitations as described in the chart below. Following medical record review it is determined that pt will benefit from occupational therapy services in order to maximize pain free and/or functional use of right hand. The following goals were discussed with the patient and patient is in agreement with them as to be addressed in the treatment plan. The patient's rehab potential is good.     Anticipated barriers to occupational therapy: edema, pain, stiffness, scar, weakness; need for education of likely and proper tx progression, estimated tx duration based on extent of symptoms pre OT and current deficits with functional use   Pt has no cultural, educational or language barriers to learning provided.    Profile and History Assessment of Occupational Performance Level of Clinical Decision Making Complexity Score    Occupational Profile:   Khalida Mckeon is a 58 y.o. female who lives alone and is currently employed Khalida Mckeon has difficulty with  ADLs and IADLs as listed previously, which  affecting his/her daily functional abilities.      Comorbidities:    has a past medical history of Hyperparathyroidism, Kidney stone, Migraines, Nephrolithiasis, and Thyroid disease.    Medical and Therapy History Review:   Brief               Performance Deficits    Physical:  Joint Mobility  Muscle Power/Strength  Skin Integrity/Scar Formation  Edema  Pain    Cognitive:  No Deficits    Psychosocial:    No Deficits     Clinical Decision Making:  low    Assessment Process:  Problem-Focused Assessments    Modification/Need for Assistance:  Not Necessary    Intervention Selection:  Limited Treatment Options       low  Based on PMHX, co morbidities , data from assessments and functional level of assistance required with task and clinical presentation directly impacting function.           The following goals were discussed with the patient and patient is in agreement with them as to be addressed in the treatment plan.     Goals:   Short term goals to be met in 4 weeks 1. Patient will be I with HEP 2. Patient will have 2/10 pain with light use 3. Patient will have = prom of bilateral wrists to enhance affected arm use with ADL      Long term goals to be met by d/c 1. Patient will be I with d/c HEP 2. Patient will have 1/10 pain with light use 3. Patient will have about 75% /pinch on affected side vs unaffected side to promote full functional use                                                                 4. Patient will be I with all light ADL      all goals ongoing unless noted above or met today or in past but not noted yet    Plan   Certification Period/Plan of care expiration: 9/5/2023 to              11-     Outpatient Occupational Therapy  2 times weekly for 13 weeks to include the following interventions: randial and  tx    Above frequency and duration in above dates may change based on patient progress and need for therapy    Naresh HERRERA CHT    I certify the need for the services furnished under this plan of care.    doctor's signature/referring provider's signature:______________________________________________________

## 2023-09-19 ENCOUNTER — DOCUMENTATION ONLY (OUTPATIENT)
Dept: REHABILITATION | Facility: HOSPITAL | Age: 58
End: 2023-09-19
Payer: MEDICAID

## 2023-09-19 NOTE — PROGRESS NOTES
Outpatient Therapy Discharge Summary     Date: 9-19-23      Name: Khalida Mckeon  [facility] Number: [id number]    Therapy Diagnosis: No diagnosis found.  Physician: No ref. provider found    Physician Orders: see evaluation  Medical Diagnosis: see evaluation  Evaluation Date: 9-5-23      Date of Last visit: 9-5-23  Total Visits Received: 1  Cancelled Visits: see epic  No Show Visits: see epic    Assessment    Goals: see last note    Discharge reason: Patient has not attended therapy since 9-5-23    Plan   This patient is discharged from Occupational Therapy

## 2023-10-09 DIAGNOSIS — G56.02 CARPAL TUNNEL SYNDROME OF LEFT WRIST: Primary | ICD-10-CM

## 2024-04-22 DIAGNOSIS — G56.02 CARPAL TUNNEL SYNDROME OF LEFT WRIST: Primary | ICD-10-CM

## 2024-05-02 ENCOUNTER — DOCUMENTATION ONLY (OUTPATIENT)
Dept: REHABILITATION | Facility: HOSPITAL | Age: 59
End: 2024-05-02

## 2024-05-02 ENCOUNTER — CLINICAL SUPPORT (OUTPATIENT)
Dept: REHABILITATION | Facility: HOSPITAL | Age: 59
End: 2024-05-02
Payer: MEDICAID

## 2024-05-02 DIAGNOSIS — M25.442 EFFUSION OF LEFT HAND: ICD-10-CM

## 2024-05-02 DIAGNOSIS — G56.02 CARPAL TUNNEL SYNDROME OF LEFT WRIST: Primary | ICD-10-CM

## 2024-05-02 DIAGNOSIS — M79.642 LEFT HAND PAIN: ICD-10-CM

## 2024-05-02 DIAGNOSIS — M25.642 STIFFNESS OF FINGER JOINT OF LEFT HAND: ICD-10-CM

## 2024-05-02 PROCEDURE — 97110 THERAPEUTIC EXERCISES: CPT | Mod: PN

## 2024-05-02 PROCEDURE — 97165 OT EVAL LOW COMPLEX 30 MIN: CPT | Mod: PN

## 2024-05-02 NOTE — PATIENT INSTRUCTIONS
home program  5-2-24   -keep sutures covered with bandaid, change bandaid once a day, change immediately if it gets wet  -use hand for extremely light use only (dressing, eating, hygiene, etc.)  -do below exercises 10 times, 6 x day (medium speed, medium force)      -we will ease into fist as well as any stretches at next visit  -keep digits loose for exercise # 1

## 2024-05-02 NOTE — PROGRESS NOTES
Faxed POC for signature and return to referring provider at [phone]    Faxed therapy progress note to referring provider for return to doctor visit scheduled for 5-4-24 to [phone]

## 2024-05-02 NOTE — PLAN OF CARE
[facility] Occupational Therapy  Initial Evaluation     Date: 5/2/2024  Name: Khalida Mckeon  Clinic Number: [id number]    Therapy Diagnosis:   Encounter Diagnoses   Name Primary?    Carpal tunnel syndrome of left wrist Yes    Left hand pain     Effusion of left hand     Stiffness of finger joint of left hand      Physician: Order, Paper    Physician Orders: evaluate and tx, see epic  Medical Diagnosis: left carpal tunnel syndrome  Surgical Procedure and Date: left carpal tunnel release, 4-30-24      See surgical report for details if applicable      Evaluation Date: 5/2/2024  Insurance Authorization Period Expiration: referral [id number] 4-22-24 thru 12-31-24              Plan of Care Certification Period: 5-2-24 thru 7-25-24                        Date of Return to referral source's office: 5-4-24    Visit # / Visits authorized: 1 / 1 referral [id number] 4-22-24 thru 12-31-24  Time In:4  Time Out: 425  Total Billable Time:  none since HEP took less than 5 minutes  Precautions:  universal, see epic, protocol if applicable  Subjective     Involved Side: left  Dominant Side: right  Date of Onset: years ago  History of Current Condition/Mechanism of Injury: insidious onset, had surgery, her today for eval  Imaging: see epic  Previous Therapy: none for above surgery, had rehab on right side after carpal tunnel release about one year ago    Past Medical History/Physical Systems Review:   Khalida Mckeon  has a past medical history of Hyperparathyroidism, Kidney stone, Migraines, Nephrolithiasis, and Thyroid disease.    Khalida Mckeon  has a past surgical history that includes Tubal ligation; Cystoscopy with ureteroscopy, retrograde pyelography, and insertion of stent (Right, 10/18/2018); Ureteroscopic removal of ureteric calculus (Right, 10/18/2018); Laser lithotripsy (Right, 10/18/2018); Removal of ureteral calculus (Right, 11/1/2018); Cystoscopy with ureteroscopy, retrograde pyelography, and insertion  of stent (Right, 11/1/2018); Laser lithotripsy (Right, 11/1/2018); Cystoscopy w/ ureteral stent removal (Right, 11/13/2018); Cystoureteroscopy with retrograde pyelography and insertion of stent into ureter (Right, 1/9/2021); and Cystoscopy w/ ureteral stent removal (Right, 2/1/2021).    Khalida has a current medication list which includes the following prescription(s): biotin, cholecalciferol (vitamin d3), citalopram, epinephrine, levothyroxine, and multivitamin.    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     Current Functional Status   Home/Living environment : lives with nobody    Limitation of Functional Status as follows: decreased motion, use, and strength with ADL   ADLs/IADLs:               See above   Leisure: not tested  pain meds: denies  nicotine: denies  caffeine: 1 energy drink daily    Objective   Posture:visually edematous wrist and hand                                           sutured incision per surgery patient sent here for  Palpation:not tested  Sensation:denies burning, numbness, tingling  ROM:    Arom df/pf 60/60 rd/ud 20/40    Prom digits full all planes all digits except LF composite flex 1 cm to dpc, intrinsic stretch 1 cm to A1        CMS Impairment/Limitation/Restriction for FOTO Survey    Therapist reviewed FOTO scores for Khalida Mckeon on 5/2/2024.   FOTO documents entered into EPIC - see Media section.                 Treatment      Treatment Time In: 320  Treatment Time Out: 325  Total Treatment time separate from Evaluation time:5    Khalida received therapeutic exercises for 5 minutes including:  -see above and/or media section                    Home Exercise Program/Education:  Issued HEP (see patient instructions in EMR in media or note) . Exercises were reviewed and Khalida was able to demonstrate them prior to the end of the session.   Pt received a written copy of exercises to perform at home. Khalida demonstrated good understanding of the education provided.  Pt was advised to perform these exercises free of pain, and to stop performing them if pain occurs.    Patient/Family Education: role of OT, goals for OT, scheduling/cancellations - pt verbalized understanding. Discussed insurance limitations with patient.    Additional Education provided: likely tx progression, expectations of rehab    Assessment     Khalida Mckeon is a 59 y.o. female referred to outpatient occupational therapy and presents with a medical diagnosis of see above resulting in Decreased ROM, Decreased muscle strength, Decreased functional hand use, Increased pain, Edema, Joint Stiffness, Scar Adhesions, and Diminished/Impaired Sensation and demonstrates limitations as described in the chart below. Following medical record review it is determined that pt will benefit from occupational therapy services in order to maximize pain free and/or functional use of left hand. The following goals were discussed with the patient and patient is in agreement with them as to be addressed in the treatment plan. The patient's rehab potential is good.     Anticipated barriers to occupational therapy: edema, pain, stiffness, scar, weakness; need for education of likely and proper tx progression, estimated tx duration based on extent of symptoms pre OT and current deficits with functional use  Pt has no cultural, educational or language barriers to learning provided.    Profile and  History Assessment of Occupational Performance Level of Clinical Decision Making Complexity Score   Occupational Profile:   Khalida Mckeon is a 59 y.o. female who lives alone and is currently employed Khalida Mckeon has difficulty with  ADLs and IADLs as listed previously, which  affecting his/her daily functional abilities.      Comorbidities:    has a past medical history of Hyperparathyroidism, Kidney stone, Migraines, Nephrolithiasis, and Thyroid disease.    Medical and Therapy History Review:   Brief               Performance Deficits    Physical:  Joint Mobility  Muscle Power/Strength  Muscle Endurance  Skin Integrity/Scar Formation  Edema  Pain    Cognitive:  No Deficits    Psychosocial:    No Deficits     Clinical Decision Making:  low    Assessment Process:  Problem-Focused Assessments    Modification/Need for Assistance:  Not Necessary    Intervention Selection:  Limited Treatment Options       low  Based on PMHX, co morbidities , data from assessments and functional level of assistance required with task and clinical presentation directly impacting function.           The following goals were discussed with the patient and patient is in agreement with them as to be addressed in the treatment plan.     Goals:   Short term goals to be met in 4 weeks 1. Patient will be I with HEP 2. Patient will have 2/10 pain with light use 3. Patient will have = prom of bilateral wrist and digits to enhance affected arm use with ADL      Long term goals to be met by d/c 1. Patient will be I with d/c HEP 2. Patient will have 1/10 pain with light use 3. Patient will have about 75% /pinch on affected side vs unaffected side to promote full functional use                                                                 4. Patient will be I with all light ADL      all goals ongoing unless noted above or met today or in past but not noted yet    Plan   Certification Period/Plan of care expiration: 5/2/2024 to              7-25-24    Outpatient Occupational Therapy  2 times weekly for 12 weeks to include the following interventions: eval and tx    Above frequency and duration in above dates may change based on patient progress and need for therapy    Naresh HERRERA CHT    I certify the need for the services furnished under this plan of care.    doctor's signature/referring provider's signature:______________________________________________________

## 2024-05-17 ENCOUNTER — DOCUMENTATION ONLY (OUTPATIENT)
Dept: REHABILITATION | Facility: HOSPITAL | Age: 59
End: 2024-05-17
Payer: MEDICAID

## 2024-05-17 NOTE — PROGRESS NOTES
Outpatient Therapy Discharge Summary     Date: 5-17-24      Name: Khalida Mckeon  [facility] Number: [id number]    Therapy Diagnosis: No diagnosis found.  Physician: No ref. provider found    Physician Orders: see evaluation  Medical Diagnosis: see evaluation  Evaluation Date: 5-2-24      Date of Last visit: 5-2-24  Total Visits Received: 1  Cancelled Visits: see epic  No Show Visits: see epic    Assessment    Goals: see last note    Discharge reason: Patient has not attended therapy since 5-2-24, 1 and only follow up after 5-2-24 fell off my schedule, patient not currently scheduled, HEP issued previously likely is the extent of what patient needs for rehab     Plan   This patient is discharged from Occupational Therapy

## 2025-07-08 ENCOUNTER — HOSPITAL ENCOUNTER (EMERGENCY)
Facility: HOSPITAL | Age: 60
Discharge: HOME | End: 2025-07-09
Attending: STUDENT IN AN ORGANIZED HEALTH CARE EDUCATION/TRAINING PROGRAM
Payer: COMMERCIAL

## 2025-07-08 VITALS
BODY MASS INDEX: 23.37 KG/M2 | DIASTOLIC BLOOD PRESSURE: 85 MMHG | RESPIRATION RATE: 18 BRPM | OXYGEN SATURATION: 98 % | TEMPERATURE: 98 F | HEIGHT: 62 IN | SYSTOLIC BLOOD PRESSURE: 174 MMHG | WEIGHT: 127 LBS | HEART RATE: 62 BPM

## 2025-07-08 DIAGNOSIS — I10 HTN (HYPERTENSION): ICD-10-CM

## 2025-07-08 DIAGNOSIS — G43.009 ATYPICAL MIGRAINE: Primary | ICD-10-CM

## 2025-07-08 DIAGNOSIS — I10 HYPERTENSION: ICD-10-CM

## 2025-07-08 LAB
ALBUMIN SERPL-MCNC: 4.6 G/DL (ref 3.5–5.2)
ALP SERPL-CCNC: 83 UNIT/L (ref 55–135)
ALT SERPL-CCNC: 22 UNIT/L (ref 10–44)
ANION GAP (SMH): 10 MMOL/L (ref 8–16)
AST SERPL-CCNC: 26 UNIT/L (ref 10–40)
BILIRUB SERPL-MCNC: 0.3 MG/DL (ref 0.1–1)
BUN SERPL-MCNC: 10 MG/DL (ref 8–23)
CALCIUM SERPL-MCNC: 9.8 MG/DL (ref 8.7–10.5)
CHLORIDE SERPL-SCNC: 102 MMOL/L (ref 95–110)
CO2 SERPL-SCNC: 26 MMOL/L (ref 23–29)
CREAT SERPL-MCNC: 0.6 MG/DL (ref 0.5–1.4)
GFR SERPLBLD CREATININE-BSD FMLA CKD-EPI: >60 ML/MIN/1.73/M2
GLUCOSE SERPL-MCNC: 95 MG/DL (ref 70–110)
POTASSIUM SERPL-SCNC: 3.6 MMOL/L (ref 3.5–5.1)
PROT SERPL-MCNC: 7.4 GM/DL (ref 6–8.4)
SODIUM SERPL-SCNC: 138 MMOL/L (ref 136–145)

## 2025-07-08 PROCEDURE — 96365 THER/PROPH/DIAG IV INF INIT: CPT

## 2025-07-08 PROCEDURE — 82040 ASSAY OF SERUM ALBUMIN: CPT

## 2025-07-08 PROCEDURE — 96375 TX/PRO/DX INJ NEW DRUG ADDON: CPT

## 2025-07-08 PROCEDURE — 93005 ELECTROCARDIOGRAM TRACING: CPT | Performed by: GENERAL PRACTICE

## 2025-07-08 PROCEDURE — 99284 EMERGENCY DEPT VISIT MOD MDM: CPT | Mod: 25

## 2025-07-08 PROCEDURE — 63600175 PHARM REV CODE 636 W HCPCS

## 2025-07-08 PROCEDURE — 93010 ELECTROCARDIOGRAM REPORT: CPT | Mod: ,,, | Performed by: GENERAL PRACTICE

## 2025-07-08 RX ORDER — PROCHLORPERAZINE EDISYLATE 5 MG/ML
10 INJECTION INTRAMUSCULAR; INTRAVENOUS
Status: COMPLETED | OUTPATIENT
Start: 2025-07-08 | End: 2025-07-08

## 2025-07-08 RX ORDER — SODIUM CHLORIDE, SODIUM LACTATE, POTASSIUM CHLORIDE, CALCIUM CHLORIDE 600; 310; 30; 20 MG/100ML; MG/100ML; MG/100ML; MG/100ML
500 INJECTION, SOLUTION INTRAVENOUS
Status: COMPLETED | OUTPATIENT
Start: 2025-07-08 | End: 2025-07-08

## 2025-07-08 RX ORDER — MAGNESIUM SULFATE HEPTAHYDRATE 40 MG/ML
2 INJECTION, SOLUTION INTRAVENOUS ONCE
Status: COMPLETED | OUTPATIENT
Start: 2025-07-08 | End: 2025-07-08

## 2025-07-08 RX ORDER — DIPHENHYDRAMINE HYDROCHLORIDE 50 MG/ML
12.5 INJECTION, SOLUTION INTRAMUSCULAR; INTRAVENOUS
Status: COMPLETED | OUTPATIENT
Start: 2025-07-08 | End: 2025-07-08

## 2025-07-08 RX ADMIN — DIPHENHYDRAMINE HYDROCHLORIDE 12.5 MG: 50 INJECTION INTRAMUSCULAR; INTRAVENOUS at 10:07

## 2025-07-08 RX ADMIN — MAGNESIUM SULFATE HEPTAHYDRATE 2 G: 40 INJECTION, SOLUTION INTRAVENOUS at 10:07

## 2025-07-08 RX ADMIN — SODIUM CHLORIDE, POTASSIUM CHLORIDE, SODIUM LACTATE AND CALCIUM CHLORIDE 500 ML: 600; 310; 30; 20 INJECTION, SOLUTION INTRAVENOUS at 10:07

## 2025-07-08 RX ADMIN — PROCHLORPERAZINE EDISYLATE 10 MG: 5 INJECTION INTRAMUSCULAR; INTRAVENOUS at 10:07

## 2025-07-08 NOTE — Clinical Note
"Khalida"Meseret Mckeon was seen and treated in our emergency department on 7/8/2025.  She may return to work on 07/09/2025.       If you have any questions or concerns, please don't hesitate to call.      Juan Pablo Srinivasan MD"

## 2025-07-09 NOTE — ED PROVIDER NOTES
Encounter Date: 7/8/2025       History     Chief Complaint   Patient presents with    Hypertension     Patient reports headache all day today, and lethargic last night. Patient noticed her BP at home was 202/113, with no hx of hypertension. Patient denies weakness, N/V      HPI    60-year-old female history of hypothyroidism and migraine presents to the emergency department for evaluation of hypertension and headache.  Patient states that headache started beginning today noted at the neck but then migrated to behind the eyes. Initially took 2 Excedrin which not alleviate her headache before taken taking Fioricet with minimal alleviation of headache.  Decided to take blood pressure which was elevated at systolics in the 200s which prompted to come to the emergency department.  Regarding headache reports location typically for prior headaches however only thing different now is the duration of it as it is typically alleviated with the Fioricet.  Currently report headache 5/10.  Not associated with any blurry vision, neck stiffness, paresthesia or numbness of extremities  States that she was seen by her primary care provider 1 month ago for routine follow up at that time blood pressure was not this high.  Denies any pain at this time.  No shortness of breath, chest pain, urinary complaints.    Review of patient's allergies indicates:   Allergen Reactions    Pcn [penicillins] Other (See Comments)     Heart racing     Past Medical History:   Diagnosis Date    Hyperparathyroidism     Kidney stone     Migraines     Nephrolithiasis     Thyroid disease      Past Surgical History:   Procedure Laterality Date    CYSTOSCOPY W/ URETERAL STENT REMOVAL Right 11/13/2018    Procedure: CYSTOSCOPY, WITH URETERAL STENT REMOVAL;  Surgeon: Chandler Pritchard MD;  Location: [facility] OR;  Service: Urology;  Laterality: Right;    CYSTOSCOPY W/ URETERAL STENT REMOVAL Right 2/1/2021    Procedure: CYSTOSCOPY WITH STENT REMOVAL;  Surgeon: Tanesha  EVANGELISTA Valderrama MD;  Location: [facility] OR;  Service: Urology;  Laterality: Right;    CYSTOSCOPY WITH URETEROSCOPY, RETROGRADE PYELOGRAPHY, AND INSERTION OF STENT Right 10/18/2018    Procedure: CYSTOSCOPY, WITH RETROGRADE PYELOGRAM AND URETERAL STENT INSERTION;  Surgeon: Chandler Pritchard MD;  [facility];  Service: Urology;  Laterality: Right;    CYSTOSCOPY WITH URETEROSCOPY, RETROGRADE PYELOGRAPHY, AND INSERTION OF STENT Right 11/1/2018    Procedure: CYSTOSCOPY, WITH RETROGRADE PYELOGRAM AND URETERAL STENT INSERTION;  Surgeon: Chandler Pritchard MD;  [facility];  Service: Urology;  Laterality: Right;    CYSTOURETEROSCOPY WITH RETROGRADE PYELOGRAPHY AND INSERTION OF STENT INTO URETER Right 1/9/2021    Procedure: CYSTOURETEROSCOPY, WITH RETROGRADE PYELOGRAM AND URETERAL STENT INSERTION;  Surgeon: Tanesha Valderrama MD;  [facility];  Service: Urology;  Laterality: Right;    LASER LITHOTRIPSY Right 10/18/2018    Procedure: LITHOTRIPSY, USING LASER;  Surgeon: Chandler Pritchard MD;  Location: [facility] OR;  Service: Urology;  Laterality: Right;    LASER LITHOTRIPSY Right 11/1/2018    Procedure: LITHOTRIPSY, USING LASER;  Surgeon: Chandler Pritchard MD;  Location: [facility] OR;  Service: Urology;  Laterality: Right;    REMOVAL OF URETERAL CALCULUS Right 11/1/2018    Procedure: REMOVAL, CALCULUS, URETER;  Surgeon: Chandler Pritchard MD;  [facility];  Service: Urology;  Laterality: Right;    TUBAL LIGATION      URETEROSCOPIC REMOVAL OF URETERIC CALCULUS Right 10/18/2018    Procedure: REMOVAL, CALCULUS, URETER, URETEROSCOPIC;  Surgeon: Chandler Pritchard MD;  [facility];  Service: Urology;  Laterality: Right;     Family History   Problem Relation Name Age of Onset    Hyperlipidemia Mother      Hypertension Mother      Diabetes Mother      Cancer Father       Social History[1]  Review of Systems  See HPI above  Physical Exam     Initial Vitals [07/08/25 2043]   BP Pulse Resp Temp SpO2   (!)  211/113 61 18 97.8 °F (36.6 °C) 100 %      MAP       --         Physical Exam    Nursing note and vitals reviewed.  Eyes: EOM are normal. Pupils are equal, round, and reactive to light.   Neck: Neck supple.   No neck rigidity   Normal range of motion.  Cardiovascular:  Normal rate and regular rhythm.           Pulmonary/Chest: She has no wheezes. She has no rhonchi. She has no rales.   Abdominal: There is no abdominal tenderness. There is no rebound and no guarding.   Musculoskeletal:         General: Normal range of motion.      Cervical back: Normal range of motion and neck supple.     Neurological: She is alert and oriented to person, place, and time. She has normal strength. No cranial nerve deficit or sensory deficit. She exhibits normal muscle tone. Coordination and gait normal. GCS eye subscore is 4. GCS verbal subscore is 5. GCS motor subscore is 6.   AAOx3, memory intact, fund of knowledge appropriate. No dysarthria.   CRANIAL NERVES:  II: Pupils equal and reactive  III, IV, VI: EOM intact, no gaze preference or deviation, no nystagmus.  V: normal sensation in V1, V2, and V3 segments bilaterally  VII: no asymmetry, no nasolabial fold flattening  VIII: normal hearing to speech  IX, X: normal palatal elevation, no uvular deviation  XI: 5/5 head turn and 5/5 shoulder shrug bilaterally  XII: midline tongue protrusion  MOTOR:  5/5 strength to bilateral upper extremities. 5/5 strength to bilateral  lower extremities.   SENSORY:  Normal finger to nose and heel to shin, no tremor, no dysmetria  STATION: normal stance, no truncal ataxia  GAIT: Normal    Skin: Skin is warm and dry.         ED Course   Procedures  Labs Reviewed   COMPREHENSIVE METABOLIC PANEL - Normal       Result Value    Sodium 138      Potassium 3.6      Chloride 102      CO2 26      Glucose 95      BUN 10      Creatinine 0.6      Calcium 9.8      Protein Total 7.4      Albumin 4.6      Bilirubin Total 0.3      ALP 83      AST 26      ALT 22       Anion Gap 10      eGFR >60     EXTRA TUBES    Narrative:     The following orders were created for panel order EXTRA TUBES.  Procedure                               Abnormality         Status                     ---------                               -----------         ------                     Light Blue Top Hold[[id number]]                             In process                 Lavender Top Hold[[id number]]                               In process                 Lavender Top Hold[[id number]]                               In process                   Please view results for these tests on the individual orders.   LIGHT BLUE TOP HOLD   LAVENDER TOP HOLD   LAVENDER TOP HOLD          Imaging Results    None          Medications   prochlorperazine injection Soln 10 mg (10 mg Intravenous Given 7/8/25 2223)   diphenhydrAMINE injection 12.5 mg (12.5 mg Intravenous Given 7/8/25 2224)   magnesium sulfate 2g in water 50mL IVPB (premix) (0 g Intravenous Stopped 7/8/25 2321)   lactated ringers infusion (500 mLs Intravenous New Bag 7/8/25 2252)     Medical Decision Making  Patient presents to emergently to the ED for evaluation of headache and elevated blood pressure.  Headache consistent with typical in terms of location however increasing in frequency.  Otherwise asymptomatic.  Vital signs noted for systolic blood pressure 211 on arrival.  Repeat systolic blood pressure when I evaluated her noted to be 206 on right arm in the systolic of 205 on the left upper extremity.  No focal likely deficit on on exam.  Differential diagnosis include not limited to atypical migraine, primary headache, hypertension emergency.  Consideration for low clinical suspicion for subarachnoid hemorrhage at this time given patient's headache consistent with patient's baseline without maximum intensity, no neck pain or nuchal rigidity noted on exam.  Believe patient's blood pressure in headache or in his warranted to treat the patient for  migraines with Compazine and Benadryl with alleviation of her symptoms.  Subsequently blood pressure noted to down trending systolic blood pressure 175.  Patient is ready to go home.  Discussed found out with primary care provider for monitoring blood pressure to see if patient needs to be placed on antihypertensives in addition to follow up for her migraines.  Discharged in stable condition.    Juan Pablo Srinivasan Jr.   [employer] PGY-4 EM        Amount and/or Complexity of Data Reviewed  Labs: ordered.    Risk  Prescription drug management.                                      Clinical Impression:  Final diagnoses:  [I10] Hypertension  [I10] HTN (hypertension)  [G43.009] Atypical migraine (Primary)          ED Disposition Condition    Discharge Stable          ED Prescriptions    None       Follow-up Information       Follow up With Specialties Details Why Contact Info    Isiah Baltazar MD Internal Medicine   [address] [place] [address] [address]  [facility] [id number]  [phone]                       [1]   Social History  Tobacco Use    Smoking status: Former     Current packs/day: 0.00     Average packs/day: 0.5 packs/day for 20.0 years (10.0 ttl pk-yrs)     Types: Cigarettes     Start date: 10/9/1994     Quit date: 10/9/2014     Years since quitting: 10.7    Smokeless tobacco: Never   Substance Use Topics    Alcohol use: Yes     Alcohol/week: 7.0 standard drinks of alcohol     Types: 7 Glasses of wine per week    Drug use: No        Juan Pablo Srinivasan MD  Resident  07/08/25 4280

## 2025-07-09 NOTE — DISCHARGE INSTRUCTIONS
Evaluating the emergency department for elevated blood pressure and headache believe that the headache and the blood pressure were intertwined.  Your blood pressure improved after we treated for your headache.  Please follow-up with your primary care doctor at the earliest convenience for follow up for your elevated blood pressure to see if he would need to be started on blood pressure medication in addition for your migraines.  Return to the emergency department for any worsening symptoms, fever how far or greater, worsening headache not a alleviated which are home regimen, neck neck pain or stiffness, loss of consciousness, new onset weakness, chest pain or shortness of breath nausea or vomiting abdominal pain.

## 2025-07-13 LAB
OHS QRS DURATION: 94 MS
OHS QTC CALCULATION: 459 MS

## (undated) DEVICE — SHEET DRAPE MEDIUM

## (undated) DEVICE — SYR ONLY LUER LOCK 20CC

## (undated) DEVICE — CATH URETERAL DUAL LUMEN 10FR

## (undated) DEVICE — BAG LINGEMAN DRAIN UROLOGY

## (undated) DEVICE — SEE MEDLINE ITEM 157117

## (undated) DEVICE — SOL WATER STRL IRR 1000ML

## (undated) DEVICE — CATH 5FR OPEN END URETERAL

## (undated) DEVICE — GLOVE SURG ULTRA TOUCH 6

## (undated) DEVICE — SET IRR URLGY 2LINE UNIV SPIKE

## (undated) DEVICE — SEE MEDLINE ITEM 152487

## (undated) DEVICE — GUIDEWIRE AMPLATZ .035X145 STR

## (undated) DEVICE — GUIDE WIRE MOTION .035 X 150CM

## (undated) DEVICE — SEE MEDLINE ITEM 157116

## (undated) DEVICE — SEE MEDLINE ITEM 157185

## (undated) DEVICE — LUBRICANT SURGILUBE 2 OZ

## (undated) DEVICE — STENT SET URETERAL 6X24CM
Type: IMPLANTABLE DEVICE | Site: URETER | Status: NON-FUNCTIONAL
Removed: 2018-10-18

## (undated) DEVICE — SCRUB HIBICLENS 4% CHG 4OZ

## (undated) DEVICE — SHEATH FLEXOR ACCESS 12X35

## (undated) DEVICE — GLOVE PROTEXIS PI SYN SURG 7

## (undated) DEVICE — FIBER LASER HOLMIUM 365 MICRON

## (undated) DEVICE — SOL IRR NACL .9% 3000ML

## (undated) DEVICE — STONE EXTRAC N-COMPASS NITINOL

## (undated) DEVICE — SET CYSTO IRRIGATION UNIV SPIK

## (undated) DEVICE — TRAY DRY SPONGE SCRUB W FOAM

## (undated) DEVICE — SOL 9P NACL IRR PIC IL

## (undated) DEVICE — KIT CATH SURESTEP 350ML URIN18

## (undated) DEVICE — SLEEVE SCD EXPRESS CALF MEDIUM

## (undated) DEVICE — SOL PVP-I SCRUB 7.5% 4OZ

## (undated) DEVICE — GAUZE SPONGE BULKEE 6X6.75IN

## (undated) DEVICE — SCRUB 10% POVIDONE IODINE 4OZ

## (undated) DEVICE — CONTAINER SPECIMEN STRL 4OZ

## (undated) DEVICE — WIRE GD LUB STD 3CM .035 150CM

## (undated) DEVICE — EXTRACTOR STONE 4WR NIT 2.2F 1

## (undated) DEVICE — SPONGE SUPER KERLIX 6X6.75IN

## (undated) DEVICE — WATER STERILE INJ 500ML BAG

## (undated) DEVICE — SEE ITEM #152308

## (undated) DEVICE — CATH POLLACK OPEN-END FLEXI-TI

## (undated) DEVICE — WIRE GUIDE LUBRCTD ANGL 3CM

## (undated) DEVICE — CATH URTRL OPEN END STR TIP 5F

## (undated) DEVICE — CABLE LASER FIBER 200 MICRON